# Patient Record
Sex: FEMALE | Race: WHITE | Employment: UNEMPLOYED | URBAN - METROPOLITAN AREA
[De-identification: names, ages, dates, MRNs, and addresses within clinical notes are randomized per-mention and may not be internally consistent; named-entity substitution may affect disease eponyms.]

---

## 2017-04-19 ENCOUNTER — OFFICE VISIT (OUTPATIENT)
Dept: CARDIOLOGY CLINIC | Age: 47
End: 2017-04-19

## 2017-04-19 VITALS
WEIGHT: 293 LBS | OXYGEN SATURATION: 94 % | DIASTOLIC BLOOD PRESSURE: 80 MMHG | HEIGHT: 65 IN | BODY MASS INDEX: 48.82 KG/M2 | HEART RATE: 70 BPM | SYSTOLIC BLOOD PRESSURE: 140 MMHG

## 2017-04-19 DIAGNOSIS — I25.118 CORONARY ARTERY DISEASE OF NATIVE ARTERY OF NATIVE HEART WITH STABLE ANGINA PECTORIS (HCC): Primary | Chronic | ICD-10-CM

## 2017-04-19 DIAGNOSIS — I10 ESSENTIAL HYPERTENSION: Chronic | ICD-10-CM

## 2017-04-19 DIAGNOSIS — E78.00 PURE HYPERCHOLESTEROLEMIA: Chronic | ICD-10-CM

## 2017-04-19 DIAGNOSIS — E66.01 MORBID OBESITY DUE TO EXCESS CALORIES (HCC): Chronic | ICD-10-CM

## 2017-04-19 PROCEDURE — G8598 ASA/ANTIPLAT THER USED: HCPCS | Performed by: INTERNAL MEDICINE

## 2017-04-19 PROCEDURE — G8427 DOCREV CUR MEDS BY ELIG CLIN: HCPCS | Performed by: INTERNAL MEDICINE

## 2017-04-19 PROCEDURE — 1036F TOBACCO NON-USER: CPT | Performed by: INTERNAL MEDICINE

## 2017-04-19 PROCEDURE — 99214 OFFICE O/P EST MOD 30 MIN: CPT | Performed by: INTERNAL MEDICINE

## 2017-04-19 PROCEDURE — G8419 CALC BMI OUT NRM PARAM NOF/U: HCPCS | Performed by: INTERNAL MEDICINE

## 2017-04-19 RX ORDER — HYDROXYZINE HYDROCHLORIDE 25 MG/1
25 TABLET, FILM COATED ORAL 3 TIMES DAILY PRN
Status: ON HOLD | COMMUNITY
End: 2019-07-20

## 2017-04-19 RX ORDER — RANOLAZINE 500 MG/1
500 TABLET, EXTENDED RELEASE ORAL 2 TIMES DAILY
Qty: 60 TABLET | Refills: 3 | Status: ON HOLD
Start: 2017-04-19 | End: 2019-07-20

## 2017-04-19 RX ORDER — LEVOTHYROXINE SODIUM 0.03 MG/1
50 TABLET ORAL DAILY
COMMUNITY

## 2017-04-19 RX ORDER — CETIRIZINE HYDROCHLORIDE 10 MG/1
10 TABLET ORAL DAILY
COMMUNITY

## 2017-04-19 RX ORDER — ZOLPIDEM TARTRATE 10 MG/1
TABLET ORAL NIGHTLY PRN
COMMUNITY

## 2017-04-19 RX ORDER — M-VIT,TX,IRON,MINS/CALC/FOLIC 27MG-0.4MG
1 TABLET ORAL DAILY
COMMUNITY

## 2017-04-19 RX ORDER — PANTOPRAZOLE SODIUM 40 MG/1
40 GRANULE, DELAYED RELEASE ORAL
Status: ON HOLD | COMMUNITY
End: 2019-07-21 | Stop reason: HOSPADM

## 2017-04-19 RX ORDER — DICYCLOMINE HCL 20 MG
20 TABLET ORAL EVERY 6 HOURS
COMMUNITY

## 2017-04-19 RX ORDER — ATORVASTATIN CALCIUM 80 MG/1
80 TABLET, FILM COATED ORAL DAILY
Qty: 30 TABLET | Refills: 5
Start: 2017-04-19

## 2017-04-19 RX ORDER — LANOLIN ALCOHOL/MO/W.PET/CERES
1000 CREAM (GRAM) TOPICAL DAILY
COMMUNITY

## 2017-04-19 RX ORDER — AMLODIPINE BESYLATE 5 MG/1
10 TABLET ORAL DAILY
COMMUNITY

## 2017-04-19 ASSESSMENT — ENCOUNTER SYMPTOMS
CHEST TIGHTNESS: 1
EYE REDNESS: 0
COUGH: 0
COLOR CHANGE: 0
EYE PAIN: 0
WHEEZING: 0
ABDOMINAL PAIN: 0
BLOOD IN STOOL: 0
SHORTNESS OF BREATH: 1

## 2018-01-29 ENCOUNTER — TELEPHONE (OUTPATIENT)
Dept: CARDIOLOGY CLINIC | Age: 48
End: 2018-01-29

## 2018-01-29 NOTE — TELEPHONE ENCOUNTER
Patient of both Dr Love Priest and [facility] calling stating she has gained 42 lbs in the last week. (EPIC last weight is 395.0) She said today's weight is 422.0. She said she has also had on and off chest pain and shortness of breath for 1.5 weeks. Made appt for Friday 1/29/2018. She wants to know if she should be seen sooner? Dr Love Priest has opeinings in [place] on Wednesday.       Will route as URGENT

## 2018-01-30 NOTE — TELEPHONE ENCOUNTER
Patient is already taking torsemide 20 mg 2 tablets BID and has an appt with Dr Douglas Perdomo on 2/2/2018. States she has blurry vision and having memory loss. Started a week and a half ago and chest pain in her shoulder.

## 2019-07-20 ENCOUNTER — APPOINTMENT (OUTPATIENT)
Dept: GENERAL RADIOLOGY | Age: 49
End: 2019-07-20
Payer: MEDICARE

## 2019-07-20 ENCOUNTER — HOSPITAL ENCOUNTER (OUTPATIENT)
Age: 49
Setting detail: OBSERVATION
Discharge: HOME | End: 2019-07-21
Attending: EMERGENCY MEDICINE | Admitting: FAMILY MEDICINE
Payer: MEDICARE

## 2019-07-20 DIAGNOSIS — R11.0 NAUSEA: ICD-10-CM

## 2019-07-20 DIAGNOSIS — R07.9 ACUTE CHEST PAIN: Primary | ICD-10-CM

## 2019-07-20 DIAGNOSIS — R53.1 GENERAL WEAKNESS: ICD-10-CM

## 2019-07-20 LAB
A/G RATIO: 1.2 (ref 1.1–2.2)
ALBUMIN SERPL-MCNC: 3.9 G/DL (ref 3.4–5)
ALP BLD-CCNC: 162 U/L (ref 40–129)
ALT SERPL-CCNC: 28 U/L (ref 10–40)
ANION GAP SERPL CALCULATED.3IONS-SCNC: 11 MMOL/L (ref 3–16)
AST SERPL-CCNC: 22 U/L (ref 15–37)
BASOPHILS ABSOLUTE: 0.1 K/UL (ref 0–0.2)
BASOPHILS RELATIVE PERCENT: 0.7 %
BILIRUB SERPL-MCNC: 0.4 MG/DL (ref 0–1)
BUN BLDV-MCNC: 12 MG/DL (ref 7–20)
CALCIUM SERPL-MCNC: 9.1 MG/DL (ref 8.3–10.6)
CHLORIDE BLD-SCNC: 109 MMOL/L (ref 99–110)
CO2: 21 MMOL/L (ref 21–32)
CREAT SERPL-MCNC: 0.5 MG/DL (ref 0.6–1.1)
EOSINOPHILS ABSOLUTE: 0.2 K/UL (ref 0–0.6)
EOSINOPHILS RELATIVE PERCENT: 1.9 %
GFR AFRICAN AMERICAN: >60
GFR NON-AFRICAN AMERICAN: >60
GLOBULIN: 3.2 G/DL
GLUCOSE BLD-MCNC: 122 MG/DL (ref 70–99)
HCT VFR BLD CALC: 42.5 % (ref 36–48)
HEMOGLOBIN: 14.3 G/DL (ref 12–16)
LIPASE: 10 U/L (ref 13–60)
LYMPHOCYTES ABSOLUTE: 1.6 K/UL (ref 1–5.1)
LYMPHOCYTES RELATIVE PERCENT: 14.9 %
MCH RBC QN AUTO: 31.1 PG (ref 26–34)
MCHC RBC AUTO-ENTMCNC: 33.7 G/DL (ref 31–36)
MCV RBC AUTO: 92.4 FL (ref 80–100)
MONOCYTES ABSOLUTE: 0.5 K/UL (ref 0–1.3)
MONOCYTES RELATIVE PERCENT: 5 %
NEUTROPHILS ABSOLUTE: 8.5 K/UL (ref 1.7–7.7)
NEUTROPHILS RELATIVE PERCENT: 77.5 %
PDW BLD-RTO: 14 % (ref 12.4–15.4)
PLATELET # BLD: 203 K/UL (ref 135–450)
PMV BLD AUTO: 9.7 FL (ref 5–10.5)
POTASSIUM SERPL-SCNC: 3.5 MMOL/L (ref 3.5–5.1)
PRO-BNP: 28 PG/ML (ref 0–124)
RBC # BLD: 4.59 M/UL (ref 4–5.2)
SODIUM BLD-SCNC: 141 MMOL/L (ref 136–145)
TOTAL PROTEIN: 7.1 G/DL (ref 6.4–8.2)
TROPONIN: <0.01 NG/ML
TROPONIN: <0.01 NG/ML
WBC # BLD: 10.9 K/UL (ref 4–11)

## 2019-07-20 PROCEDURE — 83880 ASSAY OF NATRIURETIC PEPTIDE: CPT

## 2019-07-20 PROCEDURE — 85025 COMPLETE CBC W/AUTO DIFF WBC: CPT

## 2019-07-20 PROCEDURE — 96374 THER/PROPH/DIAG INJ IV PUSH: CPT

## 2019-07-20 PROCEDURE — 2580000003 HC RX 258: Performed by: FAMILY MEDICINE

## 2019-07-20 PROCEDURE — 96372 THER/PROPH/DIAG INJ SC/IM: CPT

## 2019-07-20 PROCEDURE — 80053 COMPREHEN METABOLIC PANEL: CPT

## 2019-07-20 PROCEDURE — 6370000000 HC RX 637 (ALT 250 FOR IP): Performed by: FAMILY MEDICINE

## 2019-07-20 PROCEDURE — 96375 TX/PRO/DX INJ NEW DRUG ADDON: CPT

## 2019-07-20 PROCEDURE — G0378 HOSPITAL OBSERVATION PER HR: HCPCS

## 2019-07-20 PROCEDURE — 96376 TX/PRO/DX INJ SAME DRUG ADON: CPT

## 2019-07-20 PROCEDURE — 83690 ASSAY OF LIPASE: CPT

## 2019-07-20 PROCEDURE — 93005 ELECTROCARDIOGRAM TRACING: CPT | Performed by: EMERGENCY MEDICINE

## 2019-07-20 PROCEDURE — 36415 COLL VENOUS BLD VENIPUNCTURE: CPT

## 2019-07-20 PROCEDURE — 6360000002 HC RX W HCPCS: Performed by: PHYSICIAN ASSISTANT

## 2019-07-20 PROCEDURE — 71046 X-RAY EXAM CHEST 2 VIEWS: CPT

## 2019-07-20 PROCEDURE — 84484 ASSAY OF TROPONIN QUANT: CPT

## 2019-07-20 PROCEDURE — 99285 EMERGENCY DEPT VISIT HI MDM: CPT

## 2019-07-20 PROCEDURE — 6370000000 HC RX 637 (ALT 250 FOR IP): Performed by: EMERGENCY MEDICINE

## 2019-07-20 PROCEDURE — 6360000002 HC RX W HCPCS: Performed by: FAMILY MEDICINE

## 2019-07-20 RX ORDER — MORPHINE SULFATE 4 MG/ML
4 INJECTION, SOLUTION INTRAMUSCULAR; INTRAVENOUS EVERY 4 HOURS PRN
Status: DISCONTINUED | OUTPATIENT
Start: 2019-07-20 | End: 2019-07-21 | Stop reason: HOSPADM

## 2019-07-20 RX ORDER — HYDROXYZINE HYDROCHLORIDE 25 MG/1
25 TABLET, FILM COATED ORAL 3 TIMES DAILY PRN
Status: DISCONTINUED | OUTPATIENT
Start: 2019-07-20 | End: 2019-07-20 | Stop reason: ALTCHOICE

## 2019-07-20 RX ORDER — NITROGLYCERIN 0.4 MG/1
0.4 TABLET SUBLINGUAL EVERY 5 MIN PRN
Status: DISCONTINUED | OUTPATIENT
Start: 2019-07-20 | End: 2019-07-20 | Stop reason: SDUPTHER

## 2019-07-20 RX ORDER — CALCIUM CARBONATE 200(500)MG
1 TABLET,CHEWABLE ORAL 2 TIMES DAILY
COMMUNITY

## 2019-07-20 RX ORDER — ONDANSETRON 2 MG/ML
4 INJECTION INTRAMUSCULAR; INTRAVENOUS EVERY 6 HOURS PRN
Status: DISCONTINUED | OUTPATIENT
Start: 2019-07-20 | End: 2019-07-21 | Stop reason: HOSPADM

## 2019-07-20 RX ORDER — METOPROLOL TARTRATE 50 MG/1
100 TABLET, FILM COATED ORAL 2 TIMES DAILY
Status: DISCONTINUED | OUTPATIENT
Start: 2019-07-20 | End: 2019-07-20 | Stop reason: ALTCHOICE

## 2019-07-20 RX ORDER — ISOSORBIDE MONONITRATE 60 MG/1
120 TABLET, EXTENDED RELEASE ORAL DAILY
Status: DISCONTINUED | OUTPATIENT
Start: 2019-07-20 | End: 2019-07-20 | Stop reason: ALTCHOICE

## 2019-07-20 RX ORDER — NITROGLYCERIN 0.4 MG/1
TABLET SUBLINGUAL
Status: DISPENSED
Start: 2019-07-20 | End: 2019-07-21

## 2019-07-20 RX ORDER — ASPIRIN 81 MG/1
81 TABLET ORAL DAILY
Status: DISCONTINUED | OUTPATIENT
Start: 2019-07-21 | End: 2019-07-21 | Stop reason: HOSPADM

## 2019-07-20 RX ORDER — LEVOTHYROXINE SODIUM 0.03 MG/1
50 TABLET ORAL DAILY
Status: DISCONTINUED | OUTPATIENT
Start: 2019-07-21 | End: 2019-07-21 | Stop reason: HOSPADM

## 2019-07-20 RX ORDER — LOSARTAN POTASSIUM 50 MG/1
50 TABLET ORAL DAILY
COMMUNITY

## 2019-07-20 RX ORDER — CETIRIZINE HYDROCHLORIDE 10 MG/1
10 TABLET ORAL NIGHTLY
Status: DISCONTINUED | OUTPATIENT
Start: 2019-07-20 | End: 2019-07-21 | Stop reason: HOSPADM

## 2019-07-20 RX ORDER — NITROGLYCERIN 0.4 MG/1
0.4 TABLET SUBLINGUAL EVERY 5 MIN PRN
Status: DISCONTINUED | OUTPATIENT
Start: 2019-07-20 | End: 2019-07-21 | Stop reason: HOSPADM

## 2019-07-20 RX ORDER — ONDANSETRON 2 MG/ML
4 INJECTION INTRAMUSCULAR; INTRAVENOUS ONCE
Status: COMPLETED | OUTPATIENT
Start: 2019-07-20 | End: 2019-07-20

## 2019-07-20 RX ORDER — RANOLAZINE 500 MG/1
500 TABLET, EXTENDED RELEASE ORAL 2 TIMES DAILY
Status: DISCONTINUED | OUTPATIENT
Start: 2019-07-20 | End: 2019-07-20 | Stop reason: ALTCHOICE

## 2019-07-20 RX ORDER — ACETAZOLAMIDE 250 MG/1
500 TABLET ORAL 3 TIMES DAILY
COMMUNITY

## 2019-07-20 RX ORDER — AMLODIPINE BESYLATE 5 MG/1
10 TABLET ORAL DAILY
Status: DISCONTINUED | OUTPATIENT
Start: 2019-07-20 | End: 2019-07-21 | Stop reason: HOSPADM

## 2019-07-20 RX ORDER — ZOLPIDEM TARTRATE 5 MG/1
5 TABLET ORAL NIGHTLY PRN
Status: DISCONTINUED | OUTPATIENT
Start: 2019-07-20 | End: 2019-07-21 | Stop reason: HOSPADM

## 2019-07-20 RX ORDER — MORPHINE SULFATE 4 MG/ML
4 INJECTION, SOLUTION INTRAMUSCULAR; INTRAVENOUS ONCE
Status: COMPLETED | OUTPATIENT
Start: 2019-07-20 | End: 2019-07-20

## 2019-07-20 RX ORDER — MIRTAZAPINE 15 MG/1
7.5 TABLET, FILM COATED ORAL NIGHTLY
COMMUNITY

## 2019-07-20 RX ORDER — ACETAMINOPHEN 325 MG/1
650 TABLET ORAL EVERY 4 HOURS PRN
Status: DISCONTINUED | OUTPATIENT
Start: 2019-07-20 | End: 2019-07-21 | Stop reason: HOSPADM

## 2019-07-20 RX ORDER — SODIUM CHLORIDE 0.9 % (FLUSH) 0.9 %
10 SYRINGE (ML) INJECTION EVERY 12 HOURS SCHEDULED
Status: DISCONTINUED | OUTPATIENT
Start: 2019-07-20 | End: 2019-07-21 | Stop reason: HOSPADM

## 2019-07-20 RX ORDER — PANTOPRAZOLE SODIUM 40 MG/1
40 TABLET, DELAYED RELEASE ORAL
Status: DISCONTINUED | OUTPATIENT
Start: 2019-07-21 | End: 2019-07-21

## 2019-07-20 RX ORDER — PRAMIPEXOLE DIHYDROCHLORIDE 1.5 MG/1
1.5 TABLET ORAL 2 TIMES DAILY
COMMUNITY

## 2019-07-20 RX ORDER — TRAMADOL HYDROCHLORIDE 50 MG/1
50 TABLET ORAL EVERY 4 HOURS PRN
Status: DISCONTINUED | OUTPATIENT
Start: 2019-07-20 | End: 2019-07-21 | Stop reason: HOSPADM

## 2019-07-20 RX ORDER — POTASSIUM CHLORIDE 20MEQ/15ML
40 LIQUID (ML) ORAL 2 TIMES DAILY
COMMUNITY

## 2019-07-20 RX ORDER — OXYCODONE HYDROCHLORIDE 5 MG/1
10 TABLET ORAL EVERY 6 HOURS PRN
COMMUNITY

## 2019-07-20 RX ORDER — ESCITALOPRAM OXALATE 10 MG/1
20 TABLET ORAL DAILY
Status: DISCONTINUED | OUTPATIENT
Start: 2019-07-21 | End: 2019-07-21 | Stop reason: HOSPADM

## 2019-07-20 RX ORDER — SODIUM CHLORIDE 0.9 % (FLUSH) 0.9 %
10 SYRINGE (ML) INJECTION PRN
Status: DISCONTINUED | OUTPATIENT
Start: 2019-07-20 | End: 2019-07-21 | Stop reason: HOSPADM

## 2019-07-20 RX ORDER — DICYCLOMINE HYDROCHLORIDE 10 MG/1
20 CAPSULE ORAL EVERY 6 HOURS
Status: DISCONTINUED | OUTPATIENT
Start: 2019-07-20 | End: 2019-07-21 | Stop reason: HOSPADM

## 2019-07-20 RX ORDER — GABAPENTIN 300 MG/1
600 CAPSULE ORAL 3 TIMES DAILY
Status: DISCONTINUED | OUTPATIENT
Start: 2019-07-20 | End: 2019-07-21 | Stop reason: HOSPADM

## 2019-07-20 RX ORDER — FAMOTIDINE 20 MG/1
20 TABLET, FILM COATED ORAL NIGHTLY PRN
Status: ON HOLD | COMMUNITY
End: 2019-07-21 | Stop reason: HOSPADM

## 2019-07-20 RX ORDER — FUROSEMIDE 40 MG/1
40 TABLET ORAL DAILY
Status: DISCONTINUED | OUTPATIENT
Start: 2019-07-20 | End: 2019-07-20 | Stop reason: ALTCHOICE

## 2019-07-20 RX ORDER — ATORVASTATIN CALCIUM 40 MG/1
40 TABLET, FILM COATED ORAL DAILY
Status: DISCONTINUED | OUTPATIENT
Start: 2019-07-20 | End: 2019-07-21 | Stop reason: HOSPADM

## 2019-07-20 RX ADMIN — Medication 10 ML: at 22:30

## 2019-07-20 RX ADMIN — ONDANSETRON 4 MG: 2 INJECTION INTRAMUSCULAR; INTRAVENOUS at 16:58

## 2019-07-20 RX ADMIN — DICYCLOMINE HYDROCHLORIDE 20 MG: 10 CAPSULE ORAL at 23:01

## 2019-07-20 RX ADMIN — ZOLPIDEM TARTRATE 5 MG: 5 TABLET ORAL at 23:02

## 2019-07-20 RX ADMIN — ENOXAPARIN SODIUM 40 MG: 40 INJECTION SUBCUTANEOUS at 23:03

## 2019-07-20 RX ADMIN — CETIRIZINE HYDROCHLORIDE 10 MG: 10 TABLET, FILM COATED ORAL at 23:01

## 2019-07-20 RX ADMIN — NITROGLYCERIN 1 INCH: 20 OINTMENT TOPICAL at 17:56

## 2019-07-20 RX ADMIN — NITROGLYCERIN 0.4 MG: 0.4 TABLET, ORALLY DISINTEGRATING SUBLINGUAL at 17:56

## 2019-07-20 RX ADMIN — ONDANSETRON 4 MG: 2 INJECTION INTRAMUSCULAR; INTRAVENOUS at 23:03

## 2019-07-20 RX ADMIN — DESMOPRESSIN ACETATE 40 MG: 0.2 TABLET ORAL at 23:01

## 2019-07-20 RX ADMIN — MORPHINE SULFATE 4 MG: 4 INJECTION INTRAVENOUS at 21:15

## 2019-07-20 RX ADMIN — MORPHINE SULFATE 4 MG: 4 INJECTION INTRAVENOUS at 16:58

## 2019-07-20 RX ADMIN — GABAPENTIN 600 MG: 300 CAPSULE ORAL at 23:02

## 2019-07-20 ASSESSMENT — ENCOUNTER SYMPTOMS
VOMITING: 0
COLOR CHANGE: 0
ANAL BLEEDING: 0
BLOOD IN STOOL: 0
ABDOMINAL PAIN: 0
STRIDOR: 0
CONSTIPATION: 0
SHORTNESS OF BREATH: 1
COUGH: 0
BACK PAIN: 0
NAUSEA: 1
WHEEZING: 0
ABDOMINAL DISTENTION: 0
DIARRHEA: 0

## 2019-07-20 ASSESSMENT — PAIN SCALES - GENERAL
PAINLEVEL_OUTOF10: 5
PAINLEVEL_OUTOF10: 7

## 2019-07-20 ASSESSMENT — PAIN DESCRIPTION - LOCATION
LOCATION: CHEST;ABDOMEN
LOCATION: CHEST
LOCATION: CHEST;ABDOMEN
LOCATION: ABDOMEN;CHEST

## 2019-07-20 ASSESSMENT — PAIN DESCRIPTION - PAIN TYPE: TYPE: ACUTE PAIN

## 2019-07-20 NOTE — ED NOTES
Telemetry confirmed with 3T REFUGIO Neri, 67 NSR. Pt transported inwheelchair, on telemetry, with belongings and family, no distress noted.       Abimbola Jeong RN  07/20/19 1945

## 2019-07-20 NOTE — ED NOTES
Report called to IAIN Reinoso. All questions answered, denies concerns, agreeable to transfer. Awaiting telemetry.       Daxa Wesley RN  07/20/19 1944

## 2019-07-20 NOTE — H&P
daily 60 tablet 3    furosemide (LASIX) 40 MG tablet Take 40 mg by mouth daily      VITAMIN D, CHOLECALCIFEROL, PO Take by mouth daily      isosorbide mononitrate (IMDUR) 30 MG CR tablet Take 120 mg by mouth daily       escitalopram (LEXAPRO) 20 MG tablet Take 20 mg by mouth daily.  gabapentin (NEURONTIN) 600 MG tablet Take 600 mg by mouth 4 times daily.  metoprolol (LOPRESSOR) 100 MG tablet Take 100 mg by mouth 2 times daily.  promethazine (PHENERGAN) 25 MG tablet Take 25 mg by mouth every 4 hours as needed for Nausea.  aspirin 81 MG EC tablet Take 1 tablet by mouth daily.        Current Facility-Administered Medications   Medication Dose Route Frequency Provider Last Rate Last Dose    nitroGLYCERIN (NITROSTAT) SL tablet 0.4 mg  0.4 mg Sublingual Q5 Min PRN Ra Valentino DO   0.4 mg at 07/20/19 1756    nitroglycerin (NITRO-BID) 2 % ointment             nitroGLYCERIN (NITROSTAT) 0.4 MG SL tablet             traMADol (ULTRAM) tablet 50 mg  50 mg Oral Q4H PRN Uri Higginbotham MD        morphine injection 4 mg  4 mg Intravenous Q4H PRN Uri Higginbotham MD         Current Outpatient Medications   Medication Sig Dispense Refill    pantoprazole sodium (PROTONIX) 40 MG PACK packet Take 40 mg by mouth 2 times daily (before meals)      dicyclomine (BENTYL) 20 MG tablet Take 20 mg by mouth every 6 hours      Diphenoxylate-Atropine (LOMOTIL PO) Take by mouth      levothyroxine (SYNTHROID) 25 MCG tablet Take 25 mcg by mouth Daily      zolpidem (AMBIEN) 10 MG tablet Take by mouth nightly as needed for Sleep      cetirizine (ZYRTEC) 10 MG tablet Take 10 mg by mouth daily      vitamin B-12 (CYANOCOBALAMIN) 1000 MCG tablet Take 1,000 mcg by mouth daily      amLODIPine (NORVASC) 5 MG tablet Take 5 mg by mouth daily      hydrOXYzine (ATARAX) 25 MG tablet Take 25 mg by mouth 3 times daily as needed for Itching      Multiple Vitamins-Minerals (THERAPEUTIC MULTIVITAMIN-MINERALS) tablet Take 1 tablet by mouth daily      TRAMADOL HCL PO Take by mouth      CLONAZEPAM PO Take by mouth      atorvastatin (LIPITOR) 80 MG tablet Take 1 tablet by mouth daily 30 tablet 5    ranolazine (RANEXA) 500 MG extended release tablet Take 1 tablet by mouth 2 times daily 60 tablet 3    furosemide (LASIX) 40 MG tablet Take 40 mg by mouth daily      VITAMIN D, CHOLECALCIFEROL, PO Take by mouth daily      isosorbide mononitrate (IMDUR) 30 MG CR tablet Take 120 mg by mouth daily       escitalopram (LEXAPRO) 20 MG tablet Take 20 mg by mouth daily.  gabapentin (NEURONTIN) 600 MG tablet Take 600 mg by mouth 4 times daily.  metoprolol (LOPRESSOR) 100 MG tablet Take 100 mg by mouth 2 times daily.  promethazine (PHENERGAN) 25 MG tablet Take 25 mg by mouth every 4 hours as needed for Nausea.  aspirin 81 MG EC tablet Take 1 tablet by mouth daily. Allergies: Allergies   Allergen Reactions    Lortab [Hydrocodone-Acetaminophen] Hives    Vancomycin Itching    Hydrocodone Hives     Dilaudid okay. From lortab. Has taken vicodin w/o issue per patient      Lisinopril Other (See Comments)     cough    Adhesive Tape Hives    Sulfa Antibiotics Hives        Social History:   reports that she quit smoking about 9 years ago. Her smoking use included cigarettes. She smoked 1.00 pack per day. She has never used smokeless tobacco. She reports that she does not drink alcohol or use drugs. Family History:  family history includes Heart Disease in her father; High Blood Pressure in her mother. ,     Physical Exam:  /75   Pulse 63   Temp 98.8 °F (37.1 °C) (Oral)   Resp 20   Ht 5' 5\" (1.651 m)   Wt (!) 320 lb (145.2 kg)   SpO2 93%   BMI 53.25 kg/m²     General appearance:  Appears comfortable. Well nourished  Eyes: Sclera clear, pupils equal  ENT: Moist mucus membranes, no thrush. Trachea midline. Cardiovascular: Regular rhythm, normal S1, S2. No murmur, gallop, rub.  No edema in lower

## 2019-07-20 NOTE — ED PROVIDER NOTES
[facility]        Pt Name: Max Go  MRN: [id number]  [place] 1970  Date of evaluation: 7/20/2019  Provider: Oliva Harris PA-C  PCP: Erasto Trotter MD    This patient was seen and evaluated by the attending physician Dr Hilda Valenzuela   Patient presents with    Chest Pain     Pt reports she has CAD, arrived via EMS from the car, she states she started having chest pain today, sitting in car, L arm pain, took 1 nitro then called 911       HISTORY OF PRESENT ILLNESS   (Location/Symptom, Timing/Onset, Context/Setting, Quality, Duration, Modifying Factors, Severity)  Note limiting factors. Max Go is a 54 y.o. female who presents to the emergency department complaining of generalized weakness and fatigue starting yesterday evening. Her generalized weakness got worse today. She also reports that since last night her legs appear more swollen than usual.  When she got up to go to the car, she felt very weak. When she sat in the car, she started getting midsternal chest pain rating to the left side of her chest and into the left arm. She took one nitroglycerin tablet before paramedics arrived without any significant improvement. She feels very nauseous with this as well. She does have history of prior gastric surgery and was admitted to [facility] 2 weeks ago for nausea vomiting. They had to stretch out her esophagus and stomach during that visit. Nursing Notes were all reviewed and agreed with or any disagreements were addressed  in the HPI. REVIEW OF SYSTEMS    (2-9 systems for level 4, 10 or more for level 5)     Review of Systems   Constitutional: Positive for fatigue. Negative for chills and fever. HENT: Negative. Eyes: Negative for visual disturbance. Respiratory: Positive for shortness of breath.  Negative for cough, wheezing and Cardiovascular: Normal rate. Pulmonary/Chest: Effort normal and breath sounds normal.   Abdominal: Soft. Bowel sounds are normal. She exhibits no distension. There is no tenderness. Musculoskeletal: Normal range of motion. Trace symmetrical pretibial edema. No posterior calf or thigh tenderness, palpable cord, discoloration. Negative homans. Lymphadenopathy:     She has no cervical adenopathy. Neurological: She is alert and oriented to person, place, and time. No cranial nerve deficit (II-XII intact). No pronator drift, facial droop or slurred speech. Normal finger to nose coordination. Normal rapid alternating hand movement. Normal heel to shin coordination   5 out of 5 strength in all 4 extremities without focal asymmetric weakness, paresthesia or radiculopathy. Skin: Skin is warm and dry. Capillary refill takes less than 2 seconds. No rash noted. She is not diaphoretic. No erythema. No pallor. Psychiatric: She has a normal mood and affect. Her behavior is normal.   Nursing note and vitals reviewed.       DIAGNOSTIC RESULTS   LABS:    Labs Reviewed   CBC WITH AUTO DIFFERENTIAL - Abnormal; Notable for the following components:       Result Value    Neutrophils # 8.5 (*)     All other components within normal limits    Narrative:     Performed at:  [facility] [address], [place], [address]   Phone [phone]   COMPREHENSIVE METABOLIC PANEL - Abnormal; Notable for the following components:    Glucose 122 (*)     CREATININE 0.5 (*)     Alkaline Phosphatase 162 (*)     All other components within normal limits    Narrative:     Performed at:  [facility] [address], [place], [address] [place], [place] [address]   Phone [phone]   LIPASE - Abnormal; Notable for the following components:    Lipase 10.0 (*)     All other components within normal limits    Narrative:     Performed at:  [facility]  3000 concerning pathology. FINAL IMPRESSION      1. Acute chest pain    2. Nausea    3. General weakness          DISPOSITION/PLAN   DISPOSITION  Decision to admit      PATIENT REFERREDTO:  No follow-up provider specified.     DISCHARGE MEDICATIONS:  New Prescriptions    No medications on file       DISCONTINUED MEDICATIONS:  Discontinued Medications    No medications on file              (Please note that portions ofthis note were completed with a voice recognition program.  Efforts were made to edit the dictations but occasionally words are mis-transcribed.)    Sylwia Rose PA-C (electronically signed)           Sylwia Rose PA-C  07/20/19 1189

## 2019-07-20 NOTE — ED PROVIDER NOTES
factors and symptoms concerning for acute coronary syndrome.   She is admitted for further stabilization    [WL]      ED Course User Index  [WL] Clif Caller, [address] [facility] [place],   07/20/19 1949

## 2019-07-21 ENCOUNTER — APPOINTMENT (OUTPATIENT)
Dept: CT IMAGING | Age: 49
End: 2019-07-21
Payer: MEDICARE

## 2019-07-21 VITALS
RESPIRATION RATE: 18 BRPM | OXYGEN SATURATION: 97 % | BODY MASS INDEX: 48.82 KG/M2 | TEMPERATURE: 96 F | HEIGHT: 65 IN | HEART RATE: 54 BPM | WEIGHT: 293 LBS | DIASTOLIC BLOOD PRESSURE: 70 MMHG | SYSTOLIC BLOOD PRESSURE: 135 MMHG

## 2019-07-21 LAB
EKG ATRIAL RATE: 79 BPM
EKG ATRIAL RATE: 81 BPM
EKG DIAGNOSIS: NORMAL
EKG DIAGNOSIS: NORMAL
EKG P AXIS: 45 DEGREES
EKG P AXIS: 53 DEGREES
EKG P-R INTERVAL: 164 MS
EKG P-R INTERVAL: 170 MS
EKG Q-T INTERVAL: 374 MS
EKG Q-T INTERVAL: 386 MS
EKG QRS DURATION: 82 MS
EKG QRS DURATION: 88 MS
EKG QTC CALCULATION (BAZETT): 428 MS
EKG QTC CALCULATION (BAZETT): 448 MS
EKG R AXIS: 73 DEGREES
EKG R AXIS: 85 DEGREES
EKG T AXIS: 50 DEGREES
EKG T AXIS: 54 DEGREES
EKG VENTRICULAR RATE: 79 BPM
EKG VENTRICULAR RATE: 81 BPM
GLUCOSE BLD-MCNC: 129 MG/DL (ref 70–99)
GLUCOSE BLD-MCNC: 90 MG/DL (ref 70–99)
PERFORMED ON: ABNORMAL
PERFORMED ON: NORMAL
TROPONIN: <0.01 NG/ML

## 2019-07-21 PROCEDURE — 84484 ASSAY OF TROPONIN QUANT: CPT

## 2019-07-21 PROCEDURE — C9113 INJ PANTOPRAZOLE SODIUM, VIA: HCPCS | Performed by: INTERNAL MEDICINE

## 2019-07-21 PROCEDURE — 36415 COLL VENOUS BLD VENIPUNCTURE: CPT

## 2019-07-21 PROCEDURE — 94760 N-INVAS EAR/PLS OXIMETRY 1: CPT

## 2019-07-21 PROCEDURE — 94660 CPAP INITIATION&MGMT: CPT

## 2019-07-21 PROCEDURE — 2580000003 HC RX 258: Performed by: FAMILY MEDICINE

## 2019-07-21 PROCEDURE — 96376 TX/PRO/DX INJ SAME DRUG ADON: CPT

## 2019-07-21 PROCEDURE — 93010 ELECTROCARDIOGRAM REPORT: CPT | Performed by: INTERNAL MEDICINE

## 2019-07-21 PROCEDURE — 94761 N-INVAS EAR/PLS OXIMETRY MLT: CPT

## 2019-07-21 PROCEDURE — 6360000002 HC RX W HCPCS: Performed by: INTERNAL MEDICINE

## 2019-07-21 PROCEDURE — G0378 HOSPITAL OBSERVATION PER HR: HCPCS

## 2019-07-21 PROCEDURE — 96375 TX/PRO/DX INJ NEW DRUG ADDON: CPT

## 2019-07-21 PROCEDURE — 93005 ELECTROCARDIOGRAM TRACING: CPT | Performed by: FAMILY MEDICINE

## 2019-07-21 PROCEDURE — 99220 PR INITIAL OBSERVATION CARE/DAY 70 MINUTES: CPT | Performed by: INTERNAL MEDICINE

## 2019-07-21 PROCEDURE — 6370000000 HC RX 637 (ALT 250 FOR IP): Performed by: FAMILY MEDICINE

## 2019-07-21 PROCEDURE — 6360000002 HC RX W HCPCS: Performed by: FAMILY MEDICINE

## 2019-07-21 PROCEDURE — 74176 CT ABD & PELVIS W/O CONTRAST: CPT

## 2019-07-21 RX ORDER — ONDANSETRON 4 MG/1
4 TABLET, ORALLY DISINTEGRATING ORAL 3 TIMES DAILY PRN
Qty: 30 TABLET | Refills: 0 | Status: SHIPPED | OUTPATIENT
Start: 2019-07-21

## 2019-07-21 RX ORDER — ISOSORBIDE MONONITRATE 30 MG/1
30 TABLET, EXTENDED RELEASE ORAL DAILY
Qty: 30 TABLET | Refills: 3 | Status: SHIPPED | OUTPATIENT
Start: 2019-07-21

## 2019-07-21 RX ORDER — PANTOPRAZOLE SODIUM 40 MG/10ML
40 INJECTION, POWDER, LYOPHILIZED, FOR SOLUTION INTRAVENOUS DAILY
Status: DISCONTINUED | OUTPATIENT
Start: 2019-07-21 | End: 2019-07-21 | Stop reason: HOSPADM

## 2019-07-21 RX ORDER — METOCLOPRAMIDE HYDROCHLORIDE 5 MG/ML
10 INJECTION INTRAMUSCULAR; INTRAVENOUS ONCE
Status: COMPLETED | OUTPATIENT
Start: 2019-07-21 | End: 2019-07-21

## 2019-07-21 RX ORDER — ISOSORBIDE MONONITRATE 30 MG/1
30 TABLET, EXTENDED RELEASE ORAL DAILY
Status: DISCONTINUED | OUTPATIENT
Start: 2019-07-21 | End: 2019-07-21 | Stop reason: HOSPADM

## 2019-07-21 RX ORDER — PANTOPRAZOLE SODIUM 40 MG/10ML
40 INJECTION, POWDER, LYOPHILIZED, FOR SOLUTION INTRAVENOUS DAILY
Status: DISCONTINUED | OUTPATIENT
Start: 2019-07-22 | End: 2019-07-21

## 2019-07-21 RX ADMIN — MORPHINE SULFATE 4 MG: 4 INJECTION INTRAVENOUS at 14:36

## 2019-07-21 RX ADMIN — ASPIRIN 81 MG: 81 TABLET, COATED ORAL at 08:20

## 2019-07-21 RX ADMIN — DESMOPRESSIN ACETATE 40 MG: 0.2 TABLET ORAL at 08:20

## 2019-07-21 RX ADMIN — Medication 10 ML: at 14:37

## 2019-07-21 RX ADMIN — PANTOPRAZOLE SODIUM 40 MG: 40 INJECTION, POWDER, FOR SOLUTION INTRAVENOUS at 15:50

## 2019-07-21 RX ADMIN — MORPHINE SULFATE 4 MG: 4 INJECTION INTRAVENOUS at 05:25

## 2019-07-21 RX ADMIN — ESCITALOPRAM OXALATE 20 MG: 10 TABLET ORAL at 08:20

## 2019-07-21 RX ADMIN — DICYCLOMINE HYDROCHLORIDE 20 MG: 10 CAPSULE ORAL at 05:24

## 2019-07-21 RX ADMIN — MORPHINE SULFATE 4 MG: 4 INJECTION INTRAVENOUS at 10:35

## 2019-07-21 RX ADMIN — MORPHINE SULFATE 4 MG: 4 INJECTION INTRAVENOUS at 01:04

## 2019-07-21 RX ADMIN — PANTOPRAZOLE SODIUM 40 MG: 40 TABLET, DELAYED RELEASE ORAL at 05:25

## 2019-07-21 RX ADMIN — TRAMADOL HYDROCHLORIDE 50 MG: 50 TABLET, FILM COATED ORAL at 04:15

## 2019-07-21 RX ADMIN — NITROGLYCERIN 0.4 MG: 0.4 TABLET, ORALLY DISINTEGRATING SUBLINGUAL at 00:50

## 2019-07-21 RX ADMIN — ONDANSETRON 4 MG: 2 INJECTION INTRAMUSCULAR; INTRAVENOUS at 10:35

## 2019-07-21 RX ADMIN — LEVOTHYROXINE SODIUM 50 MCG: 25 TABLET ORAL at 05:25

## 2019-07-21 RX ADMIN — GABAPENTIN 600 MG: 300 CAPSULE ORAL at 10:47

## 2019-07-21 RX ADMIN — NITROGLYCERIN 0.4 MG: 0.4 TABLET, ORALLY DISINTEGRATING SUBLINGUAL at 00:38

## 2019-07-21 RX ADMIN — Medication 10 ML: at 08:31

## 2019-07-21 RX ADMIN — METOCLOPRAMIDE 10 MG: 5 INJECTION, SOLUTION INTRAMUSCULAR; INTRAVENOUS at 15:50

## 2019-07-21 RX ADMIN — AMLODIPINE BESYLATE 10 MG: 5 TABLET ORAL at 08:20

## 2019-07-21 ASSESSMENT — PAIN DESCRIPTION - LOCATION
LOCATION: CHEST;ABDOMEN
LOCATION: CHEST;ABDOMEN

## 2019-07-21 ASSESSMENT — PAIN SCALES - GENERAL
PAINLEVEL_OUTOF10: 4
PAINLEVEL_OUTOF10: 7
PAINLEVEL_OUTOF10: 7
PAINLEVEL_OUTOF10: 5
PAINLEVEL_OUTOF10: 7
PAINLEVEL_OUTOF10: 6
PAINLEVEL_OUTOF10: 7

## 2019-07-21 ASSESSMENT — PAIN DESCRIPTION - ORIENTATION
ORIENTATION: LEFT;POSTERIOR
ORIENTATION: LEFT;POSTERIOR

## 2019-07-21 ASSESSMENT — PAIN DESCRIPTION - PAIN TYPE: TYPE: ACUTE PAIN

## 2019-07-21 ASSESSMENT — PAIN DESCRIPTION - DIRECTION: RADIATING_TOWARDS: BACK

## 2019-07-22 NOTE — CONSULTS
on 275. While she  was helping her friend, she began with pressure in her upper back that  has really persisted for the last 24 hours. She has not found relief. She has been monitored in the hospital.  EKG showed an old inferior wall  myocardial infarction, no acute ST segment changes. Troponins have been  normal.  The patient denies shortness of breath with this. She just  feels the discomfort after she had the treatment by gastroenterology. They recommended liquid Prevacid to treat gastroesophageal reflux, but  she has not yet received the liquid Prevacid. MEDICATIONS AT HOME:  Tums p.r.n., Remeron 15 mg nightly, Prevacid 30 mg  t.i.d., losartan 50 mg daily, Diamox 500 mg t.i.d., Pepcid 20 mg as  needed, Mirapex 1.5 mg b.i.d., potassium 40 mEq b.i.d., Roxicodone 10 mg  every 6 hours as needed, Bentyl 20 mg every 6 hours as needed, Lomotil  as needed, Synthroid 50 mcg daily, Ambien 10 mg nightly as needed,  Zyrtec 10 mg daily, vitamin B12 daily, Norvasc 10 mg daily,  multivitamin, Lipitor 40 mg daily, Lexapro 20 mg daily, Neurontin 600 mg  t.i.d., Phenergan as needed, aspirin 81 mg daily, tramadol as needed. She states that at one time she was taking up to 42 different  medications since her Malika-en-Y bypass and successful weight loss. She  has been able to cut back on her medications. She previously was on  Ranexa that seemed to help angina; however, she stopped Ranexa with her  weight loss. She was on Imdur as well that seemed to help her angina. She is not sure why that medication was stopped. PAST MEDICAL HISTORY:  Notable for the coronary artery disease with  chronic occlusion of the right coronary artery that is well  collateralized, history of moderate carotid stenosis, history of  diastolic congestive heart failure, history of diabetes. She has  hyperlipidemia, hypertension, morbid obesity with successful 120 pound  weight loss. She has obstructive sleep apnea.     PAST SURGICAL HISTORY:  Includes lap-band surgery, Malika-en-Y gastric  bypass for weight loss since the lap-band did not work, cholecystectomy  in the past, hernia repair, tonsillectomy. SOCIAL HISTORY:  She lives with her . She is on medical  disability. She is hoping to return to her work as her weight continues  to improve. She quit smoking approximately 10 years ago. FAMILY HISTORY:  As above. ROS: 14 system ROS done. Pertinent positives in HPI  PHYSICAL EXAMINATION:  GENERAL:  Currently on physical examination, she is a lady who is obese. She is 5 feet 5 inches, weighs 320 pounds, but that is 120 pounds less  than her peak weight of 444 pounds. She appears comfortable, in no  acute distress. VITAL SIGNS:  Blood pressure 120/70, pulse is 64 and regular. HEENT:  Sclerae are clear. Posterior pharynx clear. NECK:  Neck is supple. There are no carotid bruits. LUNGS:  Clear. CARDIAC:  Sounds are normal.  Chest wall is nontender to palpation. ABDOMEN:  Without masses. MUSCULOSKELETAL:  There is no tenderness elicited. She moves all  extremities well. NEUROLOGIC:  Cranial nerves II through XII intact. PSYCHIATRIC:  Appropriate mood and affect. SKIN:  Warm and dry. LABORATORY DATA:  Labs are reviewed. Troponins have been normal.    DIAGNOSTIC DATA:  EKG is unchanged. IMPRESSION:  The patient with recent stress testing done outpatient,  which shows findings consistent with a chronic occlusion of her right  coronary artery. I feel the current chest pain is noncardiac in  etiology. Chest and abdominal pain most likely due to profound  gastroesophageal reflux disease post Malika-en-Y. We will try IV Protonix  to see if that alleviates her symptoms. The hyperlipidemia with  apparent good control, I do not have active labs from that. Chronic  back pain. RECOMMENDATIONS:  Give her a trial of IV Protonix.   Since troponins have  been normal, I do not feel anything has changed with her

## 2019-08-29 NOTE — DISCHARGE SUMMARY
cyanosis, no edema  Skin: No lesion or masses  Neurologic:  Neurovascularly intact without any focal sensory/motor deficits. Cranial nerves: II-XII intact, grossly non-focal.  Psychiatric:  A & O x3  Neuro: Grossly intact, moves all four extremities     Labs: For convenience and continuity at follow-up the following most recent labs are provided:    Lab Results   Component Value Date    WBC 10.9 07/20/2019    HGB 14.3 07/20/2019    HCT 42.5 07/20/2019    MCV 92.4 07/20/2019     07/20/2019     07/20/2019    K 3.5 07/20/2019     07/20/2019    CO2 21 07/20/2019    BUN 12 07/20/2019    CREATININE 0.5 07/20/2019    CALCIUM 9.1 07/20/2019    PHOS 3.7 04/07/2015    ALKPHOS 162 07/20/2019    ALT 28 07/20/2019    AST 22 07/20/2019    BILITOT 0.4 07/20/2019    BILIDIR <0.2 04/06/2015    LABALBU 3.9 07/20/2019     No results found for: INR    Radiology:  No results found. EKG     Sinus rhythm with Fusion complexesLow voltage QRSBorderline ECGWhen compared with ECG of 20-JUL-2019 16:30,Fusion complexes are now Present      The patient was seen and examined on day of discharge and this discharge summary is in conjunction with any daily progress note from day of discharge. Time Spent on discharge is 30 minutes  in the examination, evaluation, counseling and review of medications and discharge plan. Note that  30 minutes was spent in preparing discharge papers, discussing discharge with patient, medication review, etc.       Signed:    Ant Lopez MD   8/28/2019      Thank you Susan Corrigan MD for the opportunity to be involved in this patient's care.  If you have any questions or concerns please feel free to contact me at [phone]

## 2023-01-12 ENCOUNTER — HOSPITAL ENCOUNTER (INPATIENT)
Age: 53
LOS: 2 days | Discharge: HOME | DRG: 638 | End: 2023-01-14
Attending: EMERGENCY MEDICINE | Admitting: INTERNAL MEDICINE
Payer: MEDICARE

## 2023-01-12 DIAGNOSIS — Z78.9 FAILURE OF OUTPATIENT TREATMENT: ICD-10-CM

## 2023-01-12 DIAGNOSIS — L03.116 BILATERAL LOWER LEG CELLULITIS: Primary | ICD-10-CM

## 2023-01-12 DIAGNOSIS — L03.115 BILATERAL LOWER LEG CELLULITIS: Primary | ICD-10-CM

## 2023-01-12 DIAGNOSIS — R60.0 BILATERAL LOWER EXTREMITY EDEMA: ICD-10-CM

## 2023-01-12 DIAGNOSIS — R03.0 ELEVATED BLOOD PRESSURE READING: ICD-10-CM

## 2023-01-12 LAB
A/G RATIO: 1.3 (ref 1.1–2.2)
ALBUMIN SERPL-MCNC: 3.9 G/DL (ref 3.4–5)
ALP BLD-CCNC: 167 U/L (ref 40–129)
ALT SERPL-CCNC: 18 U/L (ref 10–40)
ANION GAP SERPL CALCULATED.3IONS-SCNC: 13 MMOL/L (ref 3–16)
ANION GAP SERPL CALCULATED.3IONS-SCNC: 14 MMOL/L (ref 3–16)
AST SERPL-CCNC: 21 U/L (ref 15–37)
BASOPHILS ABSOLUTE: 0.1 K/UL (ref 0–0.2)
BASOPHILS RELATIVE PERCENT: 0.9 %
BILIRUB SERPL-MCNC: 0.3 MG/DL (ref 0–1)
BILIRUBIN URINE: NEGATIVE
BLOOD, URINE: NEGATIVE
BUN BLDV-MCNC: 14 MG/DL (ref 7–20)
BUN BLDV-MCNC: 14 MG/DL (ref 7–20)
CALCIUM SERPL-MCNC: 9.1 MG/DL (ref 8.3–10.6)
CALCIUM SERPL-MCNC: 9.1 MG/DL (ref 8.3–10.6)
CHLORIDE BLD-SCNC: 102 MMOL/L (ref 99–110)
CHLORIDE BLD-SCNC: 104 MMOL/L (ref 99–110)
CLARITY: CLEAR
CO2: 24 MMOL/L (ref 21–32)
CO2: 24 MMOL/L (ref 21–32)
COLOR: YELLOW
CREAT SERPL-MCNC: 0.7 MG/DL (ref 0.6–1.1)
CREAT SERPL-MCNC: 0.7 MG/DL (ref 0.6–1.1)
EOSINOPHILS ABSOLUTE: 0.3 K/UL (ref 0–0.6)
EOSINOPHILS RELATIVE PERCENT: 2.6 %
GFR SERPL CREATININE-BSD FRML MDRD: >60 ML/MIN/{1.73_M2}
GFR SERPL CREATININE-BSD FRML MDRD: >60 ML/MIN/{1.73_M2}
GLUCOSE BLD-MCNC: 101 MG/DL (ref 70–99)
GLUCOSE BLD-MCNC: 103 MG/DL (ref 70–99)
GLUCOSE URINE: NEGATIVE MG/DL
HCG QUALITATIVE: NEGATIVE
HCT VFR BLD CALC: 44 % (ref 36–48)
HEMOGLOBIN: 14.7 G/DL (ref 12–16)
KETONES, URINE: NEGATIVE MG/DL
LACTIC ACID: 0.8 MMOL/L (ref 0.4–2)
LEUKOCYTE ESTERASE, URINE: NEGATIVE
LYMPHOCYTES ABSOLUTE: 1.6 K/UL (ref 1–5.1)
LYMPHOCYTES RELATIVE PERCENT: 14.8 %
MCH RBC QN AUTO: 30.9 PG (ref 26–34)
MCHC RBC AUTO-ENTMCNC: 33.3 G/DL (ref 31–36)
MCV RBC AUTO: 92.8 FL (ref 80–100)
MICROSCOPIC EXAMINATION: NORMAL
MONOCYTES ABSOLUTE: 0.6 K/UL (ref 0–1.3)
MONOCYTES RELATIVE PERCENT: 6 %
NEUTROPHILS ABSOLUTE: 8.1 K/UL (ref 1.7–7.7)
NEUTROPHILS RELATIVE PERCENT: 75.7 %
NITRITE, URINE: NEGATIVE
PDW BLD-RTO: 14.3 % (ref 12.4–15.4)
PH UA: 6.5 (ref 5–8)
PLATELET # BLD: 284 K/UL (ref 135–450)
PMV BLD AUTO: 8.3 FL (ref 5–10.5)
POTASSIUM REFLEX MAGNESIUM: 4.3 MMOL/L (ref 3.5–5.1)
POTASSIUM REFLEX MAGNESIUM: 4.4 MMOL/L (ref 3.5–5.1)
PRO-BNP: 34 PG/ML (ref 0–124)
PROTEIN UA: NEGATIVE MG/DL
RBC # BLD: 4.75 M/UL (ref 4–5.2)
SODIUM BLD-SCNC: 140 MMOL/L (ref 136–145)
SODIUM BLD-SCNC: 141 MMOL/L (ref 136–145)
SPECIFIC GRAVITY UA: 1.01 (ref 1–1.03)
T4 FREE: 1.2 NG/DL (ref 0.9–1.8)
TOTAL PROTEIN: 7 G/DL (ref 6.4–8.2)
TROPONIN: <0.01 NG/ML
TSH SERPL DL<=0.05 MIU/L-ACNC: 1.93 UIU/ML (ref 0.27–4.2)
URINE REFLEX TO CULTURE: NORMAL
URINE TYPE: NORMAL
UROBILINOGEN, URINE: 0.2 E.U./DL
WBC # BLD: 10.6 K/UL (ref 4–11)

## 2023-01-12 PROCEDURE — 81003 URINALYSIS AUTO W/O SCOPE: CPT

## 2023-01-12 PROCEDURE — 83605 ASSAY OF LACTIC ACID: CPT

## 2023-01-12 PROCEDURE — 6360000002 HC RX W HCPCS: Performed by: INTERNAL MEDICINE

## 2023-01-12 PROCEDURE — 84703 CHORIONIC GONADOTROPIN ASSAY: CPT

## 2023-01-12 PROCEDURE — 93970 EXTREMITY STUDY: CPT

## 2023-01-12 PROCEDURE — 83880 ASSAY OF NATRIURETIC PEPTIDE: CPT

## 2023-01-12 PROCEDURE — 36415 COLL VENOUS BLD VENIPUNCTURE: CPT

## 2023-01-12 PROCEDURE — 87040 BLOOD CULTURE FOR BACTERIA: CPT

## 2023-01-12 PROCEDURE — 84443 ASSAY THYROID STIM HORMONE: CPT

## 2023-01-12 PROCEDURE — 96365 THER/PROPH/DIAG IV INF INIT: CPT

## 2023-01-12 PROCEDURE — 99285 EMERGENCY DEPT VISIT HI MDM: CPT

## 2023-01-12 PROCEDURE — 84484 ASSAY OF TROPONIN QUANT: CPT

## 2023-01-12 PROCEDURE — 1200000000 HC SEMI PRIVATE

## 2023-01-12 PROCEDURE — 6360000002 HC RX W HCPCS: Performed by: EMERGENCY MEDICINE

## 2023-01-12 PROCEDURE — 2580000003 HC RX 258: Performed by: EMERGENCY MEDICINE

## 2023-01-12 PROCEDURE — 80053 COMPREHEN METABOLIC PANEL: CPT

## 2023-01-12 PROCEDURE — 85025 COMPLETE CBC W/AUTO DIFF WBC: CPT

## 2023-01-12 PROCEDURE — 2580000003 HC RX 258: Performed by: INTERNAL MEDICINE

## 2023-01-12 PROCEDURE — 6370000000 HC RX 637 (ALT 250 FOR IP): Performed by: INTERNAL MEDICINE

## 2023-01-12 PROCEDURE — 96375 TX/PRO/DX INJ NEW DRUG ADDON: CPT

## 2023-01-12 PROCEDURE — 84439 ASSAY OF FREE THYROXINE: CPT

## 2023-01-12 PROCEDURE — 2500000003 HC RX 250 WO HCPCS: Performed by: EMERGENCY MEDICINE

## 2023-01-12 RX ORDER — OXYCODONE HYDROCHLORIDE 10 MG/1
10 TABLET ORAL EVERY 6 HOURS PRN
Status: DISCONTINUED | OUTPATIENT
Start: 2023-01-12 | End: 2023-01-14 | Stop reason: HOSPADM

## 2023-01-12 RX ORDER — POTASSIUM CHLORIDE 20 MEQ/1
40 TABLET, EXTENDED RELEASE ORAL PRN
Status: DISCONTINUED | OUTPATIENT
Start: 2023-01-12 | End: 2023-01-14 | Stop reason: HOSPADM

## 2023-01-12 RX ORDER — FENTANYL CITRATE 50 UG/ML
100 INJECTION, SOLUTION INTRAMUSCULAR; INTRAVENOUS ONCE
Status: COMPLETED | OUTPATIENT
Start: 2023-01-12 | End: 2023-01-12

## 2023-01-12 RX ORDER — ASPIRIN 81 MG/1
81 TABLET ORAL DAILY
Status: DISCONTINUED | OUTPATIENT
Start: 2023-01-13 | End: 2023-01-14 | Stop reason: HOSPADM

## 2023-01-12 RX ORDER — PROMETHAZINE HYDROCHLORIDE 25 MG/1
25 TABLET ORAL EVERY 4 HOURS PRN
Status: DISCONTINUED | OUTPATIENT
Start: 2023-01-12 | End: 2023-01-14 | Stop reason: HOSPADM

## 2023-01-12 RX ORDER — CYCLOBENZAPRINE HCL 10 MG
10 TABLET ORAL 2 TIMES DAILY
COMMUNITY

## 2023-01-12 RX ORDER — TORSEMIDE 20 MG/1
20 TABLET ORAL DAILY
COMMUNITY

## 2023-01-12 RX ORDER — LORAZEPAM 1 MG/1
1 TABLET ORAL DAILY PRN
COMMUNITY

## 2023-01-12 RX ORDER — MAGNESIUM SULFATE IN WATER 40 MG/ML
2000 INJECTION, SOLUTION INTRAVENOUS PRN
Status: DISCONTINUED | OUTPATIENT
Start: 2023-01-12 | End: 2023-01-14 | Stop reason: HOSPADM

## 2023-01-12 RX ORDER — TRAZODONE HYDROCHLORIDE 100 MG/1
200 TABLET ORAL NIGHTLY
COMMUNITY

## 2023-01-12 RX ORDER — SODIUM CHLORIDE 9 MG/ML
INJECTION, SOLUTION INTRAVENOUS PRN
Status: DISCONTINUED | OUTPATIENT
Start: 2023-01-12 | End: 2023-01-14 | Stop reason: HOSPADM

## 2023-01-12 RX ORDER — FAMOTIDINE 20 MG/1
20 TABLET, FILM COATED ORAL 2 TIMES DAILY
Status: DISCONTINUED | OUTPATIENT
Start: 2023-01-12 | End: 2023-01-14 | Stop reason: HOSPADM

## 2023-01-12 RX ORDER — DIPHENHYDRAMINE HYDROCHLORIDE 50 MG/ML
50 INJECTION INTRAMUSCULAR; INTRAVENOUS ONCE
Status: COMPLETED | OUTPATIENT
Start: 2023-01-12 | End: 2023-01-12

## 2023-01-12 RX ORDER — DIPHENHYDRAMINE HCL 25 MG
25 TABLET ORAL DAILY PRN
Status: DISCONTINUED | OUTPATIENT
Start: 2023-01-12 | End: 2023-01-14 | Stop reason: HOSPADM

## 2023-01-12 RX ORDER — POTASSIUM CHLORIDE 7.45 MG/ML
10 INJECTION INTRAVENOUS PRN
Status: DISCONTINUED | OUTPATIENT
Start: 2023-01-12 | End: 2023-01-14 | Stop reason: HOSPADM

## 2023-01-12 RX ORDER — AMLODIPINE BESYLATE 5 MG/1
5 TABLET ORAL DAILY
Status: DISCONTINUED | OUTPATIENT
Start: 2023-01-13 | End: 2023-01-14 | Stop reason: HOSPADM

## 2023-01-12 RX ORDER — LORAZEPAM 1 MG/1
1 TABLET ORAL DAILY PRN
Status: DISCONTINUED | OUTPATIENT
Start: 2023-01-12 | End: 2023-01-14 | Stop reason: HOSPADM

## 2023-01-12 RX ORDER — LEVOTHYROXINE SODIUM 0.07 MG/1
75 TABLET ORAL DAILY
Status: DISCONTINUED | OUTPATIENT
Start: 2023-01-13 | End: 2023-01-14 | Stop reason: HOSPADM

## 2023-01-12 RX ORDER — TRAZODONE HYDROCHLORIDE 100 MG/1
200 TABLET ORAL NIGHTLY
Status: DISCONTINUED | OUTPATIENT
Start: 2023-01-12 | End: 2023-01-14 | Stop reason: HOSPADM

## 2023-01-12 RX ORDER — LIDOCAINE 50 MG/G
1 PATCH TOPICAL DAILY
COMMUNITY

## 2023-01-12 RX ORDER — ENOXAPARIN SODIUM 100 MG/ML
40 INJECTION SUBCUTANEOUS 2 TIMES DAILY
Status: DISCONTINUED | OUTPATIENT
Start: 2023-01-12 | End: 2023-01-14 | Stop reason: HOSPADM

## 2023-01-12 RX ORDER — GABAPENTIN 400 MG/1
800 CAPSULE ORAL 3 TIMES DAILY
Status: DISCONTINUED | OUTPATIENT
Start: 2023-01-12 | End: 2023-01-14 | Stop reason: HOSPADM

## 2023-01-12 RX ORDER — VANCOMYCIN 1.75 G/350ML
1250 INJECTION, SOLUTION INTRAVENOUS EVERY 12 HOURS
Status: DISCONTINUED | OUTPATIENT
Start: 2023-01-13 | End: 2023-01-13

## 2023-01-12 RX ORDER — ALBUTEROL SULFATE 90 UG/1
2 AEROSOL, METERED RESPIRATORY (INHALATION) EVERY 6 HOURS PRN
COMMUNITY

## 2023-01-12 RX ORDER — VENLAFAXINE 75 MG/1
75 TABLET ORAL 2 TIMES DAILY
COMMUNITY

## 2023-01-12 RX ORDER — MORPHINE SULFATE 2 MG/ML
1 INJECTION, SOLUTION INTRAMUSCULAR; INTRAVENOUS
Status: DISCONTINUED | OUTPATIENT
Start: 2023-01-12 | End: 2023-01-12

## 2023-01-12 RX ORDER — DIPHENHYDRAMINE HCL 25 MG
25 TABLET ORAL DAILY PRN
COMMUNITY

## 2023-01-12 RX ORDER — TORSEMIDE 20 MG/1
20 TABLET ORAL DAILY
Status: DISCONTINUED | OUTPATIENT
Start: 2023-01-13 | End: 2023-01-14 | Stop reason: HOSPADM

## 2023-01-12 RX ORDER — ACETAMINOPHEN 325 MG/1
650 TABLET ORAL EVERY 6 HOURS PRN
Status: DISCONTINUED | OUTPATIENT
Start: 2023-01-12 | End: 2023-01-14 | Stop reason: HOSPADM

## 2023-01-12 RX ORDER — ENOXAPARIN SODIUM 100 MG/ML
40 INJECTION SUBCUTANEOUS 2 TIMES DAILY
Status: CANCELLED | OUTPATIENT
Start: 2023-01-12

## 2023-01-12 RX ORDER — AMITRIPTYLINE HYDROCHLORIDE 25 MG/1
25 TABLET, FILM COATED ORAL NIGHTLY
Status: DISCONTINUED | OUTPATIENT
Start: 2023-01-12 | End: 2023-01-14 | Stop reason: HOSPADM

## 2023-01-12 RX ORDER — AMITRIPTYLINE HYDROCHLORIDE 25 MG/1
25 TABLET, FILM COATED ORAL NIGHTLY
COMMUNITY

## 2023-01-12 RX ORDER — METOPROLOL TARTRATE 50 MG/1
50 TABLET, FILM COATED ORAL 2 TIMES DAILY
Status: DISCONTINUED | OUTPATIENT
Start: 2023-01-12 | End: 2023-01-14 | Stop reason: HOSPADM

## 2023-01-12 RX ORDER — NYSTATIN 100000 [USP'U]/G
POWDER TOPICAL PRN
COMMUNITY

## 2023-01-12 RX ORDER — ONDANSETRON 2 MG/ML
8 INJECTION INTRAMUSCULAR; INTRAVENOUS ONCE
Status: COMPLETED | OUTPATIENT
Start: 2023-01-12 | End: 2023-01-12

## 2023-01-12 RX ORDER — ALBUTEROL SULFATE 90 UG/1
2 AEROSOL, METERED RESPIRATORY (INHALATION) EVERY 6 HOURS PRN
Status: DISCONTINUED | OUTPATIENT
Start: 2023-01-12 | End: 2023-01-14 | Stop reason: HOSPADM

## 2023-01-12 RX ORDER — LABETALOL HYDROCHLORIDE 5 MG/ML
10 INJECTION, SOLUTION INTRAVENOUS ONCE
Status: COMPLETED | OUTPATIENT
Start: 2023-01-12 | End: 2023-01-12

## 2023-01-12 RX ORDER — SODIUM CHLORIDE 0.9 % (FLUSH) 0.9 %
10 SYRINGE (ML) INJECTION EVERY 12 HOURS SCHEDULED
Status: DISCONTINUED | OUTPATIENT
Start: 2023-01-12 | End: 2023-01-14 | Stop reason: HOSPADM

## 2023-01-12 RX ORDER — CYCLOBENZAPRINE HCL 10 MG
10 TABLET ORAL 2 TIMES DAILY
Status: DISCONTINUED | OUTPATIENT
Start: 2023-01-12 | End: 2023-01-14 | Stop reason: HOSPADM

## 2023-01-12 RX ORDER — PRAMIPEXOLE DIHYDROCHLORIDE 0.5 MG/1
1.5 TABLET ORAL 2 TIMES DAILY
Status: DISCONTINUED | OUTPATIENT
Start: 2023-01-12 | End: 2023-01-14 | Stop reason: HOSPADM

## 2023-01-12 RX ORDER — CETIRIZINE HYDROCHLORIDE 10 MG/1
10 TABLET ORAL DAILY
Status: DISCONTINUED | OUTPATIENT
Start: 2023-01-13 | End: 2023-01-14 | Stop reason: HOSPADM

## 2023-01-12 RX ORDER — HYDROXYZINE HYDROCHLORIDE 25 MG/1
25 TABLET, FILM COATED ORAL 2 TIMES DAILY
COMMUNITY

## 2023-01-12 RX ORDER — CLINDAMYCIN PHOSPHATE 600 MG/50ML
600 INJECTION INTRAVENOUS ONCE
Status: COMPLETED | OUTPATIENT
Start: 2023-01-12 | End: 2023-01-12

## 2023-01-12 RX ORDER — HYDROXYZINE HYDROCHLORIDE 25 MG/1
25 TABLET, FILM COATED ORAL 2 TIMES DAILY
Status: DISCONTINUED | OUTPATIENT
Start: 2023-01-12 | End: 2023-01-14 | Stop reason: HOSPADM

## 2023-01-12 RX ORDER — DIPHENHYDRAMINE HYDROCHLORIDE 50 MG/ML
25 INJECTION INTRAMUSCULAR; INTRAVENOUS EVERY 12 HOURS
Status: DISCONTINUED | OUTPATIENT
Start: 2023-01-13 | End: 2023-01-14 | Stop reason: HOSPADM

## 2023-01-12 RX ORDER — ONDANSETRON 2 MG/ML
4 INJECTION INTRAMUSCULAR; INTRAVENOUS EVERY 6 HOURS PRN
Status: DISCONTINUED | OUTPATIENT
Start: 2023-01-12 | End: 2023-01-14 | Stop reason: HOSPADM

## 2023-01-12 RX ORDER — SODIUM CHLORIDE 0.9 % (FLUSH) 0.9 %
10 SYRINGE (ML) INJECTION PRN
Status: DISCONTINUED | OUTPATIENT
Start: 2023-01-12 | End: 2023-01-14 | Stop reason: HOSPADM

## 2023-01-12 RX ORDER — ATORVASTATIN CALCIUM 80 MG/1
80 TABLET, FILM COATED ORAL NIGHTLY
Status: DISCONTINUED | OUTPATIENT
Start: 2023-01-12 | End: 2023-01-14 | Stop reason: HOSPADM

## 2023-01-12 RX ORDER — PROMETHAZINE HYDROCHLORIDE 25 MG/1
12.5 TABLET ORAL EVERY 6 HOURS PRN
Status: DISCONTINUED | OUTPATIENT
Start: 2023-01-12 | End: 2023-01-12 | Stop reason: SDUPTHER

## 2023-01-12 RX ORDER — POTASSIUM CHLORIDE 750 MG/1
10 TABLET, EXTENDED RELEASE ORAL 2 TIMES DAILY
COMMUNITY

## 2023-01-12 RX ORDER — KETOROLAC TROMETHAMINE 30 MG/ML
30 INJECTION, SOLUTION INTRAMUSCULAR; INTRAVENOUS ONCE
Status: COMPLETED | OUTPATIENT
Start: 2023-01-12 | End: 2023-01-12

## 2023-01-12 RX ORDER — ACETAMINOPHEN 650 MG/1
650 SUPPOSITORY RECTAL EVERY 6 HOURS PRN
Status: DISCONTINUED | OUTPATIENT
Start: 2023-01-12 | End: 2023-01-14 | Stop reason: HOSPADM

## 2023-01-12 RX ORDER — FAMOTIDINE 20 MG/1
20 TABLET, FILM COATED ORAL 2 TIMES DAILY
COMMUNITY

## 2023-01-12 RX ADMIN — AMITRIPTYLINE HYDROCHLORIDE 25 MG: 25 TABLET, FILM COATED ORAL at 22:54

## 2023-01-12 RX ADMIN — HYDROMORPHONE HYDROCHLORIDE 1 MG: 1 INJECTION, SOLUTION INTRAMUSCULAR; INTRAVENOUS; SUBCUTANEOUS at 17:23

## 2023-01-12 RX ADMIN — ATORVASTATIN CALCIUM 80 MG: 80 TABLET, FILM COATED ORAL at 22:53

## 2023-01-12 RX ADMIN — VANCOMYCIN HYDROCHLORIDE 1500 MG: 1.5 INJECTION, POWDER, LYOPHILIZED, FOR SOLUTION INTRAVENOUS at 20:16

## 2023-01-12 RX ADMIN — ENOXAPARIN SODIUM 40 MG: 100 INJECTION SUBCUTANEOUS at 22:54

## 2023-01-12 RX ADMIN — ONDANSETRON 8 MG: 2 INJECTION INTRAMUSCULAR; INTRAVENOUS at 15:43

## 2023-01-12 RX ADMIN — DIPHENHYDRAMINE HYDROCHLORIDE 50 MG: 50 INJECTION, SOLUTION INTRAMUSCULAR; INTRAVENOUS at 20:13

## 2023-01-12 RX ADMIN — CYCLOBENZAPRINE 10 MG: 10 TABLET, FILM COATED ORAL at 22:54

## 2023-01-12 RX ADMIN — METOPROLOL TARTRATE 50 MG: 50 TABLET ORAL at 22:54

## 2023-01-12 RX ADMIN — OXYCODONE HYDROCHLORIDE 10 MG: 10 TABLET ORAL at 22:53

## 2023-01-12 RX ADMIN — GABAPENTIN 800 MG: 400 CAPSULE ORAL at 22:53

## 2023-01-12 RX ADMIN — TRAZODONE HYDROCHLORIDE 200 MG: 100 TABLET ORAL at 22:53

## 2023-01-12 RX ADMIN — SODIUM CHLORIDE, PRESERVATIVE FREE 10 ML: 5 INJECTION INTRAVENOUS at 22:55

## 2023-01-12 RX ADMIN — CLINDAMYCIN PHOSPHATE 600 MG: 600 INJECTION, SOLUTION INTRAVENOUS at 15:25

## 2023-01-12 RX ADMIN — HYDROMORPHONE HYDROCHLORIDE 0.5 MG: 1 INJECTION, SOLUTION INTRAMUSCULAR; INTRAVENOUS; SUBCUTANEOUS at 21:25

## 2023-01-12 RX ADMIN — PIPERACILLIN AND TAZOBACTAM 3375 MG: 3; .375 INJECTION, POWDER, LYOPHILIZED, FOR SOLUTION INTRAVENOUS at 17:31

## 2023-01-12 RX ADMIN — KETOROLAC TROMETHAMINE 30 MG: 30 INJECTION, SOLUTION INTRAMUSCULAR at 14:41

## 2023-01-12 RX ADMIN — FAMOTIDINE 20 MG: 20 TABLET ORAL at 22:54

## 2023-01-12 RX ADMIN — FENTANYL CITRATE 100 MCG: 50 INJECTION, SOLUTION INTRAMUSCULAR; INTRAVENOUS at 15:20

## 2023-01-12 RX ADMIN — LABETALOL HYDROCHLORIDE 10 MG: 5 INJECTION, SOLUTION INTRAVENOUS at 16:01

## 2023-01-12 RX ADMIN — PRAMIPEXOLE DIHYDROCHLORIDE 1.5 MG: 0.5 TABLET ORAL at 22:53

## 2023-01-12 RX ADMIN — HYDROXYZINE HYDROCHLORIDE 25 MG: 25 TABLET, FILM COATED ORAL at 22:53

## 2023-01-12 ASSESSMENT — PAIN DESCRIPTION - ORIENTATION
ORIENTATION: RIGHT;LEFT;LOWER

## 2023-01-12 ASSESSMENT — PAIN DESCRIPTION - DESCRIPTORS
DESCRIPTORS: PRESSURE;SHARP
DESCRIPTORS: SHARP;PRESSURE

## 2023-01-12 ASSESSMENT — PAIN DESCRIPTION - FREQUENCY
FREQUENCY: CONTINUOUS

## 2023-01-12 ASSESSMENT — PAIN - FUNCTIONAL ASSESSMENT: PAIN_FUNCTIONAL_ASSESSMENT: PREVENTS OR INTERFERES SOME ACTIVE ACTIVITIES AND ADLS

## 2023-01-12 ASSESSMENT — PAIN SCALES - GENERAL
PAINLEVEL_OUTOF10: 7
PAINLEVEL_OUTOF10: 6
PAINLEVEL_OUTOF10: 10
PAINLEVEL_OUTOF10: 7
PAINLEVEL_OUTOF10: 10
PAINLEVEL_OUTOF10: 7

## 2023-01-12 ASSESSMENT — PAIN DESCRIPTION - LOCATION
LOCATION: LEG

## 2023-01-12 ASSESSMENT — PAIN DESCRIPTION - ONSET
ONSET: ON-GOING
ONSET: ON-GOING

## 2023-01-12 NOTE — H&P
Pre-diagnosis: Melena, history of PUD    Post diagnosis: Gastritis    Sedation MAC per anesthesia    Procedure and findings: After patient consented and placed on left lateral position and adequately sedated post anesthesia, a gastroscope was inserted in the usual fashion.  This was advanced to the second part of the duodenum and the direct visualization.  The esophagus stomach and the duodenum to the second part were carefully examined.  The GE junction was at 38 cm from incisors.  The esophagus appeared unremarkable.  Some yellow bile was noted in the stomach.  There were patchy and linear erythema noted of the gastric mucosa.  A biopsy was not done.  This has been done in the in the past.  The duodenum to the second part appeared unremarkable.  There was no blood, or bleeding seen.  The air was suctioned out and the scope removed from the patient.    Patient tolerated procedure well and there was no immediate complication.    Recommendations:  Monitor any signs of GI bleeding and H&H  Resume diet  May go home if is stable           Electronically Signed On 05.03.2019 13:05  ___________________________________________________   Dave Garcia MD     Hospital Medicine History & Physical      PCP: Horacio Kohli MD    Date of Admission: 1/12/2023    Chief Complaint:    Chief Complaint   Patient presents with    Leg Swelling     Leg swelling increasing for past month. Has cellulitis in both legs. Dr. Marli Lopez in for eval     History Of Present Illness:    Patient is a 61-year-old female with past medical history of CAD, hypertension hyperlipidemia diabetes mellitus who presents to the hospital for bilateral lower extremity swelling as well as redness. According to the patient it has been going on for about a month, she tried outpatient antibiotics which did not improve her rash. She has a history of similar infections or bleeding legs troponin consult with management and plan. Patient otherwise denied fever chills diarrhea constipation dysuria. Patient mentions she feels nauseated      Past Medical History:          Diagnosis Date    CAD (coronary artery disease)     Cath 09 100% RCA w/ collaterals, nonobstructive dz LCX, LVEF 55%- med tx. Cath 9/27/12- known RCA occlusion, nonobstructive dz of LAD and LCx, LVEF 60%- Med tx. Carotid stenosis     Cellulitis     CHF (congestive heart failure) ([address]     Echo 9/2012 normal    Diabetes ([address]     History of tobacco use     quit 2009    Hyperlipidemia     rec semi annual lipids with LDL goal <70    Hypertension     controlled. Morbid obesity ([address]     weight loss discussed    LAW (obstructive sleep apnea)     Sleep apnea        Past Surgical History:          Procedure Laterality Date    ABDOMINAL SURGERY      CARDIAC CATHETERIZATION  9/27/2012    100% RCA with collaterals    CHOLECYSTECTOMY      COLON SURGERY      ENDOSCOPY, COLON, DIAGNOSTIC      GASTRIC BAND      gastric sleeve    HERNIA REPAIR      LAPAROSCOPIC GASTRIC BANDING      TONSILLECTOMY         Medications Prior to Admission:      Prior to Admission medications    Medication Sig Start Date End Date Taking?  Authorizing Provider   potassium chloride (KLOR-CON M) 10 MEQ extended release tablet Take 10 mEq by mouth 2 times daily   Yes Historical Provider, MD   traZODone (DESYREL) 100 MG tablet Take 200 mg by mouth nightly   Yes Historical Provider, MD   torsemide (DEMADEX) 20 MG tablet Take 20 mg by mouth daily   Yes Historical Provider, MD   venlafaxine (EFFEXOR) 75 MG tablet Take 75 mg by mouth 2 times daily   Yes Historical Provider, MD   hydrOXYzine HCl (ATARAX) 25 MG tablet Take 25 mg by mouth in the morning and at bedtime   Yes Historical Provider, MD   lidocaine (LIDODERM) 5 % Place 1 patch onto the skin daily 12 hours on, 12 hours off. Apply to back   Yes Historical Provider, MD   metoprolol tartrate (LOPRESSOR) 25 MG tablet Take 50 mg by mouth 2 times daily   Yes Historical Provider, MD   nystatin (MYCOSTATIN) 546634 UNIT/GM powder Apply topically as needed (to skin folds) Apply topically 4 times daily. Yes Historical Provider, MD   albuterol sulfate HFA (VENTOLIN HFA) 108 (90 Base) MCG/ACT inhaler Inhale 2 puffs into the lungs every 6 hours as needed for Wheezing   Yes Historical Provider, MD   amitriptyline (ELAVIL) 25 MG tablet Take 25 mg by mouth nightly   Yes Historical Provider, MD   cyclobenzaprine (FLEXERIL) 10 MG tablet Take 10 mg by mouth in the morning and at bedtime   Yes Historical Provider, MD   diclofenac sodium (VOLTAREN) 1 % GEL Apply topically as needed for Pain   Yes Historical Provider, MD   famotidine (PEPCID) 20 MG tablet Take 20 mg by mouth 2 times daily   Yes Historical Provider, MD   gi cocktail Take 30 mLs by mouth daily as needed   Yes Historical Provider, MD   LORazepam (ATIVAN) 1 MG tablet Take 1 mg by mouth daily as needed for Anxiety.    Yes Historical Provider, MD   diphenhydrAMINE (BENADRYL) 25 MG tablet Take 25 mg by mouth daily as needed for Itching   Yes Historical Provider, MD   ondansetron (ZOFRAN-ODT) 4 MG disintegrating tablet Take 1 tablet by mouth 3 times daily as needed for Nausea or Vomiting 7/21/19 Otilio Elliott MD   pramipexole (MIRAPEX) 1.5 MG tablet Take 1.5 mg by mouth 2 times daily    Historical Provider, MD   oxyCODONE HCl (OXY-IR) 10 MG immediate release tablet Take 10 mg by mouth every 6 hours as needed for Pain. Historical Provider, MD   levothyroxine (SYNTHROID) 75 MCG tablet Take 75 mcg by mouth Daily    Historical Provider, MD   cetirizine (ZYRTEC) 10 MG tablet Take 10 mg by mouth daily    Historical Provider, MD   vitamin B-12 (CYANOCOBALAMIN) 1000 MCG tablet Take 1,000 mcg by mouth daily    Historical Provider, MD   amLODIPine (NORVASC) 5 MG tablet Take 5 mg by mouth daily    Historical Provider, MD   Multiple Vitamins-Minerals (THERAPEUTIC MULTIVITAMIN-MINERALS) tablet Take 1 tablet by mouth daily    Historical Provider, MD   atorvastatin (LIPITOR) 80 MG tablet Take 1 tablet by mouth daily  Patient taking differently: Take 80 mg by mouth at bedtime 4/19/17   Joyce Pennington MD   gabapentin (NEURONTIN) 800 MG tablet Take 800 mg by mouth three times daily. Historical Provider, MD   promethazine (PHENERGAN) 25 MG tablet Take 25 mg by mouth every 4 hours as needed for Nausea. Historical Provider, MD   aspirin 81 MG EC tablet Take 1 tablet by mouth daily. 9/8/14   Jake Shoemaker MD       Allergies:  Lortab [hydrocodone-acetaminophen], Vancomycin, Hydrocodone, Lisinopril, Morphine, Adhesive tape, and Sulfa antibiotics    Social History:      TOBACCO:   reports that she quit smoking about 13 years ago. Her smoking use included cigarettes. She smoked an average of 1 pack per day. She has never used smokeless tobacco.  ETOH:   reports no history of alcohol use. Family History:       Reviewed in detail and non contributory          Problem Relation Age of Onset    High Blood Pressure Mother     Heart Disease Father        REVIEW OF SYSTEMS:   Pertinent positives as noted in the HPI. All other systems reviewed and negative.     PHYSICAL EXAM PERFORMED:    BP (!) 150/80   Pulse 70 Temp 97.3 °F (36.3 °C) (Oral)   Resp 17   Ht 5' 5\" (1.651 m)   Wt (!) 360 lb 0.2 oz (163.3 kg)   SpO2 94%   BMI 59.91 kg/m²     General appearance:  No apparent distress, cooperative. HEENT:  Normal cephalic, atraumatic without obvious deformity. Conjunctivae/corneas clear. Neck: Supple, with full range of motion. No cervical lymphadenopathy  Respiratory:  Normal respiratory effort. Clear to auscultation, bilaterally without Rales/Wheezes/Rhonchi. Cardiovascular:  Regular rate and rhythm with normal S1/S2 without murmurs, rubs or gallops. Abdomen: Soft, non-tender, non-distended, normal bowel sounds. Musculoskeletal:  redness b/l lower legs, Swelling notes, +1 edema   Skin: as above rash visible  Neurologic:  Neurologically intact without any focal sensory/motor deficits. grossly non-focal.  Psychiatric:  Alert and oriented, normal mood  Peripheral Pulses: +2 palpable, equal bilaterally       Labs:     Recent Labs     01/12/23  1350   WBC 10.6   HGB 14.7   HCT 44.0        Recent Labs     01/12/23  1350     141   K 4.4  4.3     104   CO2 24  24   BUN 14  14   CREATININE 0.7  0.7   CALCIUM 9.1  9.1     Recent Labs     01/12/23  1350   AST 21   ALT 18   BILITOT 0.3   ALKPHOS 167*     No results for input(s): INR in the last 72 hours.   Recent Labs     01/12/23  1350   TROPONINI <0.01       Urinalysis:      Lab Results   Component Value Date/Time    NITRU Negative 01/12/2023 04:37 PM    [address] 04/05/2015 08:05 PM    RBCUA >100 04/05/2015 08:05 PM    BLOODU Negative 01/12/2023 04:37 PM    SPECGRAV 1.014 01/12/2023 04:37 PM    GLUCOSEU Negative 01/12/2023 04:37 PM       Radiology:       VL Extremity Venous Bilateral                 Active Hospital Problems    Diagnosis Date Noted    Cellulitis [L03.90] 10/21/2015       Patient is a 56-year-old female with past medical history of CAD, hypertension hyperlipidemia diabetes mellitus who presents to the hospital for bilateral lower extremity swelling as well as redness. According to the patient it has been going on for about a month, she tried outpatient antibiotics which did not improve her rash. She has a history of similar infections or bleeding legs troponin consult with management and plan. Patient otherwise denied fever chills diarrhea constipation dysuria. Patient mentions she feels nauseated    Assessment  Bilateral leg cellulitis  Uncontrolled hypertension  CAD  Hypertension  Hyperlipidemia  Diabetes mellitus    Plan  Status post bilateral lower extremity ultrasound performed in ED-negative for DVT  Start IV vancomycin, Zosyn  Check blood cultures, urine culture  Resume home medications  DVT prophylaxix - home coumadin  Diet: No diet orders on file  Code Status: Prior    PT/OT Eval Status: ordered    Dispo - pending clinical improvement       Casimiro Paget, MD    The note was completed using EMR and Dragon dictation system. Every effort was made to ensure accuracy; however, inadvertent computerized transcription errors may be present. Thank you Rica Ramon MD for the opportunity to be involved in this patient's care. If you have any questions or concerns please feel free to contact me at [phone].     Casimiro Paget, MD

## 2023-01-12 NOTE — ED PROVIDER NOTES
[address] [facility]      Pt Name: Leticia Sandy  MRN: [id number]  [place] 1970  Date of evaluation: 1/12/2023  Provider: Gregorio Aranda, [address] [place] [address]  Chief Complaint   Patient presents with    Leg Swelling     Leg swelling increasing for past month. Has cellulitis in both legs. Dr. Espinosa Files in for eval         This patient is at risk for a communicable infection. Therefore, personal protection equipment consisting of a mask was worn for the exam.    HPI  Leticia Sandy is a 50 y.o. female who presents with bilateral leg swellings been present for 1 month. She was seen in this emergency department and treated on cephalexin but did not get any better. She denies any fevers but has had chills for 2 days. She has been nauseated. She is not vomiting had diarrhea. She is not on any immunosuppressive drugs. She had Doppler study of her legs at [facility] but that was -1-month ago. She denies any follow-up. She does have a history of similar symptoms and had to be admitted to the hospital for IV antibiotics. She denies being diabetic. Nothing makes it better or worse. She describes it as severe. She can no longer walk due to the pain. She has been taking Tylenol for pain at home. ? REVIEW OF SYSTEMS  All systems negative except as noted in the HPI. Reviewed Nurses' notes and concur. No LMP recorded. PAST MEDICAL HISTORY  Past Medical History:   Diagnosis Date    CAD (coronary artery disease)     Cath 09 100% RCA w/ collaterals, nonobstructive dz LCX, LVEF 55%- med tx. Cath 9/27/12- known RCA occlusion, nonobstructive dz of LAD and LCx, LVEF 60%- Med tx. Carotid stenosis     Cellulitis     CHF (congestive heart failure) [facility]     Echo 9/2012 normal    Diabetes ([facility]     History of tobacco use     quit 2009    Hyperlipidemia     rec semi annual lipids with LDL goal <70    Hypertension     controlled.     Morbid obesity ([address]     weight loss discussed    LAW (obstructive sleep apnea)     Sleep apnea        FAMILY HISTORY  Family History   Problem Relation Age of Onset    High Blood Pressure Mother     Heart Disease Father        SOCIAL HISTORY   reports that she quit smoking about 13 years ago. Her smoking use included cigarettes. She smoked an average of 1 pack per day. She has never used smokeless tobacco. She reports that she does not drink alcohol and does not use drugs. SURGICAL HISTORY  Past Surgical History:   Procedure Laterality Date    ABDOMINAL SURGERY      CARDIAC CATHETERIZATION  9/27/2012    100% RCA with collaterals    CHOLECYSTECTOMY      COLON SURGERY      ENDOSCOPY, COLON, DIAGNOSTIC      GASTRIC BAND      gastric sleeve    HERNIA REPAIR      LAPAROSCOPIC GASTRIC BANDING      TONSILLECTOMY         CURRENT MEDICATIONS  Current Outpatient Rx   Medication Sig Dispense Refill    potassium chloride (KLOR-CON M) 10 MEQ extended release tablet Take 10 mEq by mouth 2 times daily      traZODone (DESYREL) 100 MG tablet Take 200 mg by mouth nightly      torsemide (DEMADEX) 20 MG tablet Take 20 mg by mouth daily      venlafaxine (EFFEXOR) 75 MG tablet Take 75 mg by mouth 2 times daily      hydrOXYzine HCl (ATARAX) 25 MG tablet Take 25 mg by mouth in the morning and at bedtime      lidocaine (LIDODERM) 5 % Place 1 patch onto the skin daily 12 hours on, 12 hours off. Apply to back      metoprolol tartrate (LOPRESSOR) 25 MG tablet Take 50 mg by mouth 2 times daily      nystatin (MYCOSTATIN) 108225 UNIT/GM powder Apply topically as needed (to skin folds) Apply topically 4 times daily.       albuterol sulfate HFA (VENTOLIN HFA) 108 (90 Base) MCG/ACT inhaler Inhale 2 puffs into the lungs every 6 hours as needed for Wheezing      amitriptyline (ELAVIL) 25 MG tablet Take 25 mg by mouth nightly      cyclobenzaprine (FLEXERIL) 10 MG tablet Take 10 mg by mouth in the morning and at bedtime      diclofenac sodium (VOLTAREN) 1 % GEL Apply topically as needed for Pain      famotidine (PEPCID) 20 MG tablet Take 20 mg by mouth 2 times daily      gi cocktail Take 30 mLs by mouth daily as needed      LORazepam (ATIVAN) 1 MG tablet Take 1 mg by mouth daily as needed for Anxiety. diphenhydrAMINE (BENADRYL) 25 MG tablet Take 25 mg by mouth daily as needed for Itching      ondansetron (ZOFRAN-ODT) 4 MG disintegrating tablet Take 1 tablet by mouth 3 times daily as needed for Nausea or Vomiting 30 tablet 0    pramipexole (MIRAPEX) 1.5 MG tablet Take 1.5 mg by mouth 2 times daily      oxyCODONE HCl (OXY-IR) 10 MG immediate release tablet Take 10 mg by mouth every 6 hours as needed for Pain.      levothyroxine (SYNTHROID) 75 MCG tablet Take 75 mcg by mouth Daily      cetirizine (ZYRTEC) 10 MG tablet Take 10 mg by mouth daily      vitamin B-12 (CYANOCOBALAMIN) 1000 MCG tablet Take 1,000 mcg by mouth daily      amLODIPine (NORVASC) 5 MG tablet Take 5 mg by mouth daily      Multiple Vitamins-Minerals (THERAPEUTIC MULTIVITAMIN-MINERALS) tablet Take 1 tablet by mouth daily      atorvastatin (LIPITOR) 80 MG tablet Take 1 tablet by mouth daily (Patient taking differently: Take 80 mg by mouth at bedtime) 30 tablet 5    gabapentin (NEURONTIN) 800 MG tablet Take 800 mg by mouth three times daily. promethazine (PHENERGAN) 25 MG tablet Take 25 mg by mouth every 4 hours as needed for Nausea. aspirin 81 MG EC tablet Take 1 tablet by mouth daily. ALLERGIES  Allergies   Allergen Reactions    Lortab [Hydrocodone-Acetaminophen] Hives    Vancomycin Itching    Hydrocodone Hives     Dilaudid okay. From lortab.  Has taken vicodin w/o issue per patient      Lisinopril Other (See Comments)     cough    Morphine Itching    Adhesive Tape Hives    Sulfa Antibiotics Hives       PHYSICAL EXAM  VITAL SIGNS: BP (!) 150/80   Pulse 70   Temp 97.3 °F (36.3 °C) (Oral)   Resp 17   Ht 5' 5\" (1.651 m)   Wt (!) 360 lb 0.2 oz (163.3 kg)   SpO2 94%   BMI 59.91 kg/m² Constitutional: Well-developed, well-nourished, appears normal, nontoxic, activity: Resting comfortably on the chair in [address]. Does not appear ill or toxic in the room. She does appear in pain when I palpate her bilateral extremities  Skin: Warm, Dry, moderate bilateral lower extremity erythema from the hip to the feet, No other rash. no Lacerations, no Abrasions. Back: No tenderness, Full range of motion, No scoliosis. Extremities:  neurovascular intact, no deformity, moderate bilateral lower extremity edema and swelling, moderate bilateral lower extremity erythema, no lacerations noted, no amputations, no cyanosis, no mottling, no ecchymosis, severe tenderness of bilateral lower extremities, capillary refill less than 2 seconds. Musculoskeletal: No amputations or gross deformities are noted  Neurologic: Alert & oriented x 3, Normal motor function, Normal sensory function, No focal deficits noted. Psychiatric: Anxious, Judgment normal, Mood normal, no confusion. COURSE & MEDICAL DECISION MAKING  Pertinent Labs & Imaging studies reviewed. (See chart for details)    LABORATORY  Labs Reviewed   CBC WITH AUTO DIFFERENTIAL - Abnormal; Notable for the following components:       Result Value    Neutrophils Absolute 8.1 (*)     All other components within normal limits   BASIC METABOLIC PANEL W/ REFLEX TO MG FOR LOW K - Abnormal; Notable for the following components:    Glucose 101 (*)     All other components within normal limits   COMPREHENSIVE METABOLIC PANEL W/ REFLEX TO MG FOR LOW K - Abnormal; Notable for the following components:    Glucose 103 (*)     Alkaline Phosphatase 167 (*)     All other components within normal limits   CULTURE, BLOOD 1   CULTURE, BLOOD 2   HCG, SERUM, QUALITATIVE   BRAIN NATRIURETIC PEPTIDE   TROPONIN   LACTIC ACID   URINALYSIS WITH REFLEX TO CULTURE   TSH   T4, FREE       RADIOLOGY/PROCEDURES  I personally reviewed the images for this case.   VL Extremity Venous Bilateral Vitals:    01/12/23 1615 01/12/23 1616 01/12/23 1617 01/12/23 1630   BP: 136/64   (!) 150/80   Pulse: 65 66 65 70   Resp: 22 20 19 17   Temp:       TempSrc:       SpO2: 94% 92% 93% 94%   Weight:       Height:           Medications   HYDROmorphone (DILAUDID) injection 1 mg (has no administration in time range)   piperacillin-tazobactam (ZOSYN) 3,375 mg in dextrose 5 % 50 mL IVPB (mini-bag) (has no administration in time range)   labetalol (NORMODYNE;TRANDATE) injection 10 mg (10 mg IntraVENous Given 1/12/23 1601)   clindamycin (CLEOCIN) 600 mg in dextrose 5 % 50 mL IVPB (0 mg IntraVENous Stopped 1/12/23 1556)   fentaNYL (SUBLIMAZE) injection 100 mcg (100 mcg IntraVENous Given 1/12/23 1520)   ketorolac (TORADOL) injection 30 mg (30 mg IntraVENous Given 1/12/23 1441)   ondansetron (ZOFRAN) injection 8 mg (8 mg IntraVENous Given 1/12/23 1543)       New Prescriptions    No medications on file       SEP-1 CORE MEASURE DATA  Exclusion criteria: the patient is NOT to be included for sepsis due to:  SIRS criteria are not met    Patient remained stable in the ED. her white count was normal.  Doppler study of her legs was negative. Glucose was 103. Patient was given clindamycin in the emergency department because she stated that she was on cephalexin 2 weeks ago. However, she changed her story and stated she was on clindamycin 2 weeks ago. Therefore, vancomycin as dosed per pharmacy and Zosyn 3.375 mg were ordered for her infection. Patient was admitted to the hospital for further evaluation and treatment. She was given fentanyl 50 mg IV then fentanyl 100 mcg IV. She was also given ketorolac 30 mg IV and Zofran 8 mg IV. She was also given Dilaudid for her final dose. Patient is on Percocet 10 mg 3 times a day at home. I do not feel I can control her pain at home. And has failed outpatient therapy. Therefore, she needs admitted to the hospital for further evaluation and treatment.     Diagnostic considerations include but are not limited to:  Necrotizing fasciitis, cellulitis, erysipelas, suppurative thrombophlebitis, aseptic superficial thrombophlebitis, DVT, gout, compartment syndrome, erythema migrans (lyme disease), contact dermatitis, lymphedema, other      EKG-ordered to rule out myocardial infarction, rhythm disturbances, conduction disturbances, LVH, MCKENNA, pericarditis, voltage abnormalities, or other pathology that might be causing the patient's symptoms. Chest x-ray-chest x-ray was ordered to rule out pneumonia, pneumothorax, congestive heart failure, cardiomegaly, chest masses, aortic aneurysm, hiatal hernia, rib fractures, or any other pathology that might be causing the patient's symptoms    CBC-CBC was ordered to rule out anemia, infection, abnormal platelet count, polycythemia, abnormal Red cell pathology, or any other pathology that might be causing the patient's symptoms    CMP-CMP was ordered to rule out electrolyte abnormalities, liver dysfunction, kidney dysfunction, electrolyte imbalance, abnormal transaminases, or any other pathology that might be causing the patient's symptoms. Urine-urinalysis was ordered to rule out infection, renal failure, dehydration, pregnancy, proteinuria, bilirubinuria, or any other pathology that might be causing the patient's symptoms    The patient's blood pressure was found to be elevated according to CMS/Medicare and the Affordable Care Act/[facility] criteria. Elevated blood pressure could occur because of pain or anxiety or other reasons and does not mean that they need to have their blood pressure treated or medications otherwise adjusted. However, this could also be a sign that they will need to have their blood pressure treated or medications changed. The patient was instructed to follow up closely with their personal physician to have their blood pressure rechecked.  The patient was instructed to take a list of recent blood pressure readings to their next visit with their personal physician. I reviewed old records     (This chart has been completed using [address]. Although attempts have been made to ensure accuracy, words and/or phrases may not be transcribed as intended.)    Patient requested pain medicines at the time of her exam.    IMPRESSION(S):  1. Bilateral lower leg cellulitis    2. Elevated blood pressure reading    3. Bilateral lower extremity edema    4. Failure of outpatient treatment        ?   Recheck Times: [address] Time: 30 minutes       Lius Alberto Olivas DO  01/12/23 5133

## 2023-01-12 NOTE — CONSULTS
Clinical Pharmacy Note  Vancomycin Consult    Pharmacy consult received for one-time dose of vancomycin in the Emergency Department per Dr. Des Ruiz. Ht Readings from Last 1 Encounters:   01/12/23 5' 5\" (1.651 m)        Wt Readings from Last 1 Encounters:   01/12/23 (!) 360 lb 0.2 oz (163.3 kg)         Assessment/Plan:  Vancomycin 1500mg x 1 in ED. If Vancomycin is to continue on admission and pharmacy is to manage dosing, please re-consult with admission orders.       Nely King [facility]  1/12/2023 5:32 PM

## 2023-01-12 NOTE — PROGRESS NOTES
Medication Reconciliation    List of medications patient is currently taking is complete. Source of information: 1.  Conversation with patient and family at bedside                                      2. EPIC records      Allergies  Lortab [hydrocodone-acetaminophen], Vancomycin, Hydrocodone, Lisinopril, Adhesive tape, and Sulfa antibiotics

## 2023-01-13 ENCOUNTER — APPOINTMENT (OUTPATIENT)
Dept: GENERAL RADIOLOGY | Age: 53
DRG: 638 | End: 2023-01-13
Payer: MEDICARE

## 2023-01-13 LAB
ANION GAP SERPL CALCULATED.3IONS-SCNC: 8 MMOL/L (ref 3–16)
BASOPHILS ABSOLUTE: 0.1 K/UL (ref 0–0.2)
BASOPHILS RELATIVE PERCENT: 1.2 %
BLOOD CULTURE, ROUTINE: NORMAL
BUN BLDV-MCNC: 20 MG/DL (ref 7–20)
CALCIUM SERPL-MCNC: 8.7 MG/DL (ref 8.3–10.6)
CHLORIDE BLD-SCNC: 99 MMOL/L (ref 99–110)
CO2: 30 MMOL/L (ref 21–32)
CREAT SERPL-MCNC: 1 MG/DL (ref 0.6–1.1)
CULTURE, BLOOD 2: NORMAL
EKG ATRIAL RATE: 63 BPM
EKG DIAGNOSIS: NORMAL
EKG P AXIS: 37 DEGREES
EKG P-R INTERVAL: 180 MS
EKG Q-T INTERVAL: 436 MS
EKG QRS DURATION: 90 MS
EKG QTC CALCULATION (BAZETT): 446 MS
EKG R AXIS: 80 DEGREES
EKG T AXIS: 54 DEGREES
EKG VENTRICULAR RATE: 63 BPM
EOSINOPHILS ABSOLUTE: 0.2 K/UL (ref 0–0.6)
EOSINOPHILS RELATIVE PERCENT: 2.3 %
GFR SERPL CREATININE-BSD FRML MDRD: >60 ML/MIN/{1.73_M2}
GLUCOSE BLD-MCNC: 112 MG/DL (ref 70–99)
HCT VFR BLD CALC: 42.9 % (ref 36–48)
HEMOGLOBIN: 14 G/DL (ref 12–16)
LYMPHOCYTES ABSOLUTE: 1.3 K/UL (ref 1–5.1)
LYMPHOCYTES RELATIVE PERCENT: 14.5 %
MCH RBC QN AUTO: 30.7 PG (ref 26–34)
MCHC RBC AUTO-ENTMCNC: 32.5 G/DL (ref 31–36)
MCV RBC AUTO: 94.3 FL (ref 80–100)
MONOCYTES ABSOLUTE: 0.7 K/UL (ref 0–1.3)
MONOCYTES RELATIVE PERCENT: 8.3 %
NEUTROPHILS ABSOLUTE: 6.5 K/UL (ref 1.7–7.7)
NEUTROPHILS RELATIVE PERCENT: 73.7 %
PDW BLD-RTO: 14.4 % (ref 12.4–15.4)
PLATELET # BLD: 262 K/UL (ref 135–450)
PMV BLD AUTO: 8.4 FL (ref 5–10.5)
POTASSIUM REFLEX MAGNESIUM: 4.1 MMOL/L (ref 3.5–5.1)
RBC # BLD: 4.55 M/UL (ref 4–5.2)
SODIUM BLD-SCNC: 137 MMOL/L (ref 136–145)
WBC # BLD: 8.9 K/UL (ref 4–11)

## 2023-01-13 PROCEDURE — 2580000003 HC RX 258: Performed by: INTERNAL MEDICINE

## 2023-01-13 PROCEDURE — 94761 N-INVAS EAR/PLS OXIMETRY MLT: CPT

## 2023-01-13 PROCEDURE — 6360000002 HC RX W HCPCS: Performed by: INTERNAL MEDICINE

## 2023-01-13 PROCEDURE — 36415 COLL VENOUS BLD VENIPUNCTURE: CPT

## 2023-01-13 PROCEDURE — 80048 BASIC METABOLIC PNL TOTAL CA: CPT

## 2023-01-13 PROCEDURE — 6370000000 HC RX 637 (ALT 250 FOR IP): Performed by: INTERNAL MEDICINE

## 2023-01-13 PROCEDURE — 97161 PT EVAL LOW COMPLEX 20 MIN: CPT

## 2023-01-13 PROCEDURE — 97165 OT EVAL LOW COMPLEX 30 MIN: CPT

## 2023-01-13 PROCEDURE — 97530 THERAPEUTIC ACTIVITIES: CPT

## 2023-01-13 PROCEDURE — 71046 X-RAY EXAM CHEST 2 VIEWS: CPT

## 2023-01-13 PROCEDURE — 97116 GAIT TRAINING THERAPY: CPT

## 2023-01-13 PROCEDURE — 85025 COMPLETE CBC W/AUTO DIFF WBC: CPT

## 2023-01-13 PROCEDURE — 93010 ELECTROCARDIOGRAM REPORT: CPT | Performed by: INTERNAL MEDICINE

## 2023-01-13 PROCEDURE — 93005 ELECTROCARDIOGRAM TRACING: CPT | Performed by: INTERNAL MEDICINE

## 2023-01-13 PROCEDURE — 1200000000 HC SEMI PRIVATE

## 2023-01-13 RX ORDER — METOCLOPRAMIDE HYDROCHLORIDE 5 MG/ML
10 INJECTION INTRAMUSCULAR; INTRAVENOUS EVERY 6 HOURS PRN
Status: DISCONTINUED | OUTPATIENT
Start: 2023-01-13 | End: 2023-01-14 | Stop reason: HOSPADM

## 2023-01-13 RX ORDER — LANOLIN ALCOHOL/MO/W.PET/CERES
3 CREAM (GRAM) TOPICAL NIGHTLY
Status: DISCONTINUED | OUTPATIENT
Start: 2023-01-13 | End: 2023-01-14 | Stop reason: HOSPADM

## 2023-01-13 RX ORDER — KETOROLAC TROMETHAMINE 15 MG/ML
15 INJECTION, SOLUTION INTRAMUSCULAR; INTRAVENOUS ONCE
Status: COMPLETED | OUTPATIENT
Start: 2023-01-13 | End: 2023-01-13

## 2023-01-13 RX ORDER — CEPHALEXIN 500 MG/1
500 CAPSULE ORAL EVERY 12 HOURS SCHEDULED
Status: DISCONTINUED | OUTPATIENT
Start: 2023-01-13 | End: 2023-01-14 | Stop reason: HOSPADM

## 2023-01-13 RX ADMIN — GABAPENTIN 800 MG: 400 CAPSULE ORAL at 08:28

## 2023-01-13 RX ADMIN — METOPROLOL TARTRATE 50 MG: 50 TABLET ORAL at 08:28

## 2023-01-13 RX ADMIN — CYCLOBENZAPRINE 10 MG: 10 TABLET, FILM COATED ORAL at 20:22

## 2023-01-13 RX ADMIN — GABAPENTIN 800 MG: 400 CAPSULE ORAL at 20:22

## 2023-01-13 RX ADMIN — HYDROXYZINE HYDROCHLORIDE 25 MG: 25 TABLET, FILM COATED ORAL at 08:28

## 2023-01-13 RX ADMIN — HYDROMORPHONE HYDROCHLORIDE 0.5 MG: 1 INJECTION, SOLUTION INTRAMUSCULAR; INTRAVENOUS; SUBCUTANEOUS at 06:01

## 2023-01-13 RX ADMIN — DIPHENHYDRAMINE HCL 25 MG: 25 TABLET ORAL at 17:37

## 2023-01-13 RX ADMIN — HYDROMORPHONE HYDROCHLORIDE 0.5 MG: 1 INJECTION, SOLUTION INTRAMUSCULAR; INTRAVENOUS; SUBCUTANEOUS at 20:24

## 2023-01-13 RX ADMIN — SODIUM CHLORIDE: 9 INJECTION, SOLUTION INTRAVENOUS at 08:05

## 2023-01-13 RX ADMIN — HYDROMORPHONE HYDROCHLORIDE 0.5 MG: 1 INJECTION, SOLUTION INTRAMUSCULAR; INTRAVENOUS; SUBCUTANEOUS at 00:34

## 2023-01-13 RX ADMIN — LEVOTHYROXINE SODIUM 75 MCG: 0.07 TABLET ORAL at 06:44

## 2023-01-13 RX ADMIN — ONDANSETRON 4 MG: 2 INJECTION INTRAMUSCULAR; INTRAVENOUS at 07:28

## 2023-01-13 RX ADMIN — HYDROMORPHONE HYDROCHLORIDE 0.5 MG: 1 INJECTION, SOLUTION INTRAMUSCULAR; INTRAVENOUS; SUBCUTANEOUS at 16:22

## 2023-01-13 RX ADMIN — TRAZODONE HYDROCHLORIDE 200 MG: 100 TABLET ORAL at 20:23

## 2023-01-13 RX ADMIN — VANCOMYCIN HYDROCHLORIDE 1250 MG: 1.25 INJECTION, POWDER, LYOPHILIZED, FOR SOLUTION INTRAVENOUS at 08:26

## 2023-01-13 RX ADMIN — HYDROXYZINE HYDROCHLORIDE 25 MG: 25 TABLET, FILM COATED ORAL at 20:23

## 2023-01-13 RX ADMIN — OXYCODONE HYDROCHLORIDE 10 MG: 10 TABLET ORAL at 17:37

## 2023-01-13 RX ADMIN — SODIUM CHLORIDE, PRESERVATIVE FREE 10 ML: 5 INJECTION INTRAVENOUS at 08:37

## 2023-01-13 RX ADMIN — GABAPENTIN 800 MG: 400 CAPSULE ORAL at 14:23

## 2023-01-13 RX ADMIN — METOCLOPRAMIDE 10 MG: 5 INJECTION, SOLUTION INTRAMUSCULAR; INTRAVENOUS at 18:24

## 2023-01-13 RX ADMIN — METOCLOPRAMIDE 10 MG: 5 INJECTION, SOLUTION INTRAMUSCULAR; INTRAVENOUS at 09:44

## 2023-01-13 RX ADMIN — CETIRIZINE HYDROCHLORIDE 10 MG: 10 TABLET, FILM COATED ORAL at 08:28

## 2023-01-13 RX ADMIN — Medication 3 MG: at 20:23

## 2023-01-13 RX ADMIN — PIPERACILLIN AND TAZOBACTAM 3375 MG: 3; .375 INJECTION, POWDER, LYOPHILIZED, FOR SOLUTION INTRAVENOUS at 00:41

## 2023-01-13 RX ADMIN — DIPHENHYDRAMINE HYDROCHLORIDE 25 MG: 50 INJECTION, SOLUTION INTRAMUSCULAR; INTRAVENOUS at 07:57

## 2023-01-13 RX ADMIN — ENOXAPARIN SODIUM 40 MG: 100 INJECTION SUBCUTANEOUS at 20:23

## 2023-01-13 RX ADMIN — HYDROMORPHONE HYDROCHLORIDE 0.5 MG: 1 INJECTION, SOLUTION INTRAMUSCULAR; INTRAVENOUS; SUBCUTANEOUS at 13:15

## 2023-01-13 RX ADMIN — ONDANSETRON 4 MG: 2 INJECTION INTRAMUSCULAR; INTRAVENOUS at 00:34

## 2023-01-13 RX ADMIN — CEPHALEXIN 500 MG: 500 CAPSULE ORAL at 09:44

## 2023-01-13 RX ADMIN — ASPIRIN 81 MG: 81 TABLET, COATED ORAL at 08:27

## 2023-01-13 RX ADMIN — KETOROLAC TROMETHAMINE 15 MG: 15 INJECTION, SOLUTION INTRAMUSCULAR; INTRAVENOUS at 19:10

## 2023-01-13 RX ADMIN — OXYCODONE HYDROCHLORIDE 10 MG: 10 TABLET ORAL at 08:33

## 2023-01-13 RX ADMIN — AMITRIPTYLINE HYDROCHLORIDE 25 MG: 25 TABLET, FILM COATED ORAL at 20:23

## 2023-01-13 RX ADMIN — ATORVASTATIN CALCIUM 80 MG: 80 TABLET, FILM COATED ORAL at 20:23

## 2023-01-13 RX ADMIN — FAMOTIDINE 20 MG: 20 TABLET ORAL at 08:37

## 2023-01-13 RX ADMIN — HYDROMORPHONE HYDROCHLORIDE 0.5 MG: 1 INJECTION, SOLUTION INTRAMUSCULAR; INTRAVENOUS; SUBCUTANEOUS at 09:45

## 2023-01-13 RX ADMIN — PRAMIPEXOLE DIHYDROCHLORIDE 1.5 MG: 0.5 TABLET ORAL at 20:22

## 2023-01-13 RX ADMIN — TORSEMIDE 20 MG: 20 TABLET ORAL at 08:28

## 2023-01-13 RX ADMIN — PRAMIPEXOLE DIHYDROCHLORIDE 1.5 MG: 0.5 TABLET ORAL at 08:28

## 2023-01-13 RX ADMIN — AMLODIPINE BESYLATE 5 MG: 5 TABLET ORAL at 08:27

## 2023-01-13 RX ADMIN — SODIUM CHLORIDE, PRESERVATIVE FREE 10 ML: 5 INJECTION INTRAVENOUS at 20:25

## 2023-01-13 RX ADMIN — CEPHALEXIN 500 MG: 500 CAPSULE ORAL at 20:23

## 2023-01-13 RX ADMIN — ENOXAPARIN SODIUM 40 MG: 100 INJECTION SUBCUTANEOUS at 08:27

## 2023-01-13 RX ADMIN — CYCLOBENZAPRINE 10 MG: 10 TABLET, FILM COATED ORAL at 08:28

## 2023-01-13 RX ADMIN — FAMOTIDINE 20 MG: 20 TABLET ORAL at 20:23

## 2023-01-13 RX ADMIN — METOPROLOL TARTRATE 50 MG: 50 TABLET ORAL at 20:27

## 2023-01-13 ASSESSMENT — PAIN DESCRIPTION - LOCATION
LOCATION: LEG

## 2023-01-13 ASSESSMENT — PAIN DESCRIPTION - ORIENTATION
ORIENTATION: RIGHT;LEFT
ORIENTATION: RIGHT;LEFT
ORIENTATION: LOWER
ORIENTATION: LOWER;LEFT;RIGHT
ORIENTATION: RIGHT;LEFT
ORIENTATION: RIGHT;LEFT
ORIENTATION: RIGHT;LEFT;MID
ORIENTATION: RIGHT;LEFT
ORIENTATION: LEFT;RIGHT;LOWER

## 2023-01-13 ASSESSMENT — PAIN SCALES - GENERAL
PAINLEVEL_OUTOF10: 7
PAINLEVEL_OUTOF10: 5

## 2023-01-13 ASSESSMENT — PAIN DESCRIPTION - DESCRIPTORS
DESCRIPTORS: ACHING;BURNING;DISCOMFORT
DESCRIPTORS: ACHING;BURNING;CRAMPING;DISCOMFORT

## 2023-01-13 NOTE — PROGRESS NOTES
Pt Admitted to 4W from ED. Vitals obtained on arrival and stable. Admission and Head-to-Toe assessment completed. Medications administered per order. Emesis x1. Ondansetron administered. Pt currently resting in bed. Call light in reach.    Vin Messer RN

## 2023-01-13 NOTE — PROGRESS NOTES
Occupational Therapy  Facility/Department: [facility] MED SURG  Occupational Therapy Initial Assessment and Discharge    Name: Jn Willis  : 1970  MRN: [id number]  Date of Service: 2023    Discharge Recommendations:  Home with assist PRN  OT Equipment Recommendations  Equipment Needed: No     Jn Willis scored a 22/24 on the AM-PAC ADL Inpatient form. At this time, no further OT is recommended upon discharge due to pt functioning at/near baseline. Recommend patient returns to prior setting with prior services. Patient Diagnosis(es): The primary encounter diagnosis was Bilateral lower leg cellulitis. Diagnoses of Elevated blood pressure reading, Bilateral lower extremity edema, and Failure of outpatient treatment were also pertinent to this visit. Past Medical History:  has a past medical history of CAD (coronary artery disease), Carotid stenosis, Cellulitis, CHF (congestive heart failure) ([facility] [address] Diabetes ([facility] [address] History of tobacco use, Hyperlipidemia, Hypertension, Morbid obesity ([facility] [address] LAW (obstructive sleep apnea), and Sleep apnea. Past Surgical History:  has a past surgical history that includes Lap Band; Cholecystectomy; hernia repair; Abdomen surgery; Tonsillectomy; Endoscopy, colon, diagnostic; Cardiac catheterization (2012); Gastric Band; and Colon surgery. Assessment   Assessment: Pt presents to be functioning at/near baseline, and does not require any additional acute OT. Pt UAL in room completing ADLs with mod I, denies need for OT services. No acute OT goals identified, will discharge. Anticipate pt will be safe to return home with assist prn.   Prognosis: Good  Decision Making: Low Complexity  History: see above  REQUIRES OT FOLLOW-UP: No  Activity Tolerance  Activity Tolerance: Patient Tolerated treatment well        Plan   Occupational Therapy Plan  Times Per Week: d/c acute OT     Restrictions  Restrictions/Precautions  Restrictions/Precautions: Up Ad Janel    Subjective   General  Chart Reviewed: Yes  Additional Pertinent Hx: Per Shelton Hickman MD's H&P:\" Patient is a 82-year-old female with past medical history of CAD, hypertension hyperlipidemia diabetes mellitus who presents to the hospital for bilateral lower extremity swelling as well as redness. According to the patient it has been going on for about a month, she tried outpatient antibiotics which did not improve her rash. She has a history of similar infections or bleeding legs troponin consult with management and plan. Patient otherwise denied fever chills diarrhea constipation dysuria. Patient mentions she feels nauseated. \" Pt admitted with B LE cellulitis. B LE ultrasound performed in ED-negative for DVT  Family / Caregiver Present: No  Referring Practitioner: Shelton Hickman MD  Diagnosis: B LE cellulitis  Subjective  Subjective: Pt met b/s for OT eval/tx. Pt in bed on arrival, agreeable to participate in therapy. Pt reports 6/10 pain B LEs. Pt reports UAL in room completing ADLs independently.      Social/Functional History  Social/Functional History  Lives With: Spouse  Type of Home: House  Home Layout: One level (ranch with basement, laundry upstairs)  Home Access: Stairs to enter without rails  Entrance Stairs - Number of Steps: 2  Bathroom Shower/Tub: Walk-in shower  Bathroom Toilet: Handicap height  Bathroom Equipment: Grab bars in shower, Built-in shower seat, Grab bars around toilet, 3-in-1 commode  Bathroom Accessibility: Accessible  Home Equipment: Walker, rolling, Wheelchair-manual, Rollator, Reacher, Long-handled shoehorn, Sock aid (toileting aid)  Has the patient had two or more falls in the past year or any fall with injury in the past year?: No  ADL Assistance: Independent  Homemaking Assistance: Independent  Ambulation Assistance: Independent  Transfer Assistance: Independent  Active : No  Patient's  Info: pt able to drive, but  drives mostly d/t pt hx of syncopal episodes  Occupation: On disability  IADL Comments: pt sleeps in regular bed       Objective       Safety Devices  Type of Devices: Call light within reach; Left in bed (the pt is up ad abel)    Balance  Sitting: Intact  Standing: Intact (mod I)  Gait  Overall Level of Assistance: Modified independent (Pt completed fxl mobility ~20 ft with mod I. Pt slow d/t B LE pain, but no LOB.)  Assistive Device: Gait belt    AROM: Within functional limits  PROM: Within functional limits  Strength: Within functional limits  Coordination: Within functional limits  Tone: Normal  Sensation: Intact    ADL  LE Dressing: Modified independent   LE Dressing Skilled Clinical Factors: to don slip on shoes  Additional Comments: Pt UAL in room completing ADLs with mod I. Activity Tolerance  Activity Tolerance: Patient tolerated evaluation without incident    Transfers  Sit to stand: Independent  Stand to sit: Independent    Vision  Vision: Impaired  Vision Exceptions: Wears glasses for reading  Hearing  Hearing: Within functional limits    Cognition  Overall Cognitive Status: WFL  Orientation  Overall Orientation Status: Within Functional Limits    Education Given To: Patient  Education Provided: Role of Therapy  Barriers to Learning: None  Education Outcome: Verbalized understanding;Demonstrated understanding                          AM-PAC Score        AM-[facility] Inpatient Daily Activity Raw Score: 22 (01/13/23 1139)  AM-PAC Inpatient ADL T-Scale Score : 47.1 (01/13/23 1139)  ADL Inpatient CMS 0-100% Score: 25.8 (01/13/23 1139)  ADL Inpatient CMS G-Code Modifier : CJ (01/13/23 1139)           Goals  Short Term Goals  Time Frame for Short Term Goals: no acute OT goals identified.        Therapy Time   Individual Concurrent Group Co-treatment   Time In 1110         Time Out 1139         Minutes 29         Timed Code Treatment Minutes: 229 [facility], OTR/L 1514

## 2023-01-13 NOTE — ED NOTES
Report called to [facility] on [address] [address]. Transportation requested.      Aden Delong RN  01/12/23 2055

## 2023-01-13 NOTE — PROGRESS NOTES
Hospitalist Progress Note      PCP: Romel Benton MD    Date of Admission: 1/12/2023    Chief Complaint:   Chief Complaint   Patient presents with    Leg Swelling     Leg swelling increasing for past month. Has cellulitis in both legs. Dr. Osmar Floyd in for [facility] Course: Patient is a 61-year-old female with past medical history of CAD, hypertension hyperlipidemia diabetes mellitus who presents to the hospital for bilateral lower extremity swelling as well as redness. According to the patient it has been going on for about a month, she tried outpatient antibiotics which did not improve her rash. She has a history of similar infections or bleeding legs troponin consult with management and plan. Patient otherwise denied fever chills diarrhea constipation dysuria.   Patient mentions she feels nauseated        Subjective: feeling better       Medications:  Reviewed    Infusion Medications    sodium chloride 20 mL/hr at 01/13/23 0805     Scheduled Medications    vancomycin  1,250 mg IntraVENous Q12H    sodium chloride flush  10 mL IntraVENous 2 times per day    amitriptyline  25 mg Oral Nightly    amLODIPine  5 mg Oral Daily    aspirin  81 mg Oral Daily    atorvastatin  80 mg Oral Nightly    cetirizine  10 mg Oral Daily    cyclobenzaprine  10 mg Oral BID    famotidine  20 mg Oral BID    gabapentin  800 mg Oral TID    hydrOXYzine HCl  25 mg Oral BID    levothyroxine  75 mcg Oral Daily    metoprolol tartrate  50 mg Oral BID    pramipexole  1.5 mg Oral BID    torsemide  20 mg Oral Daily    traZODone  200 mg Oral Nightly    piperacillin-tazobactam  3,375 mg IntraVENous Q8H    enoxaparin  40 mg SubCUTAneous BID    vancomycin (VANCOCIN) intermittent dosing (placeholder)   Other RX Placeholder    diphenhydrAMINE  25 mg IntraVENous Q12H     PRN Meds: sodium chloride flush, sodium chloride, potassium chloride **OR** potassium alternative oral replacement **OR** potassium chloride, potassium chloride, magnesium sulfate, [DISCONTINUED] promethazine **OR** ondansetron, magnesium hydroxide, acetaminophen **OR** acetaminophen, albuterol sulfate HFA, diphenhydrAMINE, LORazepam, oxyCODONE HCl, promethazine, HYDROmorphone      Intake/Output Summary (Last 24 hours) at 1/13/2023 3054  Last data filed at 1/12/2023 1927  Gross per 24 hour   Intake 51.13 ml   Output --   Net 51.13 ml       Physical Exam Performed:    BP (!) 141/92   Pulse 70   Temp 97.8 °F (36.6 °C) (Oral)   Resp 16   Ht 5' 5\" (1.651 m)   Wt (!) 358 lb 11 oz (162.7 kg)   SpO2 91%   BMI 59.69 kg/m²     General appearance: No apparent distress, appears stated age and cooperative. HEENT: Pupils equal, round, and reactive to light. Conjunctivae/corneas clear. Neck: Supple, with full range of motion. No jugular venous distention. Trachea midline. Respiratory:  Normal respiratory effort. Clear to auscultation, bilaterally without Rales/Wheezes/Rhonchi. Cardiovascular: Regular rate and rhythm with normal S1/S2 without murmurs, rubs or gallops. Abdomen: Soft, non-tender, non-distended with normal bowel sounds. Musculoskeletal: No clubbing, cyanosis or edema bilaterally. Full range of motion without deformity. Skin: Skin color, texture, turgor normal.  No rashes or lesions. Neurologic:  Neurovascularly intact without any focal sensory/motor deficits.  Cranial nerves: II-XII intact, grossly non-focal.  Psychiatric: Alert and oriented, thought content appropriate, normal insight  Capillary Refill: Brisk, 3 seconds, normal   Peripheral Pulses: +2 palpable, equal bilaterally       Labs:   Recent Labs     01/12/23  1350 01/13/23  0351   WBC 10.6 8.9   HGB 14.7 14.0   HCT 44.0 42.9    262     Recent Labs     01/12/23  1350 01/13/23  0431     141 137   K 4.4  4.3 4.1     104 99   CO2 24  24 30   BUN 14  14 20   CREATININE 0.7  0.7 1.0   CALCIUM 9.1  9.1 8.7     Recent Labs     01/12/23  1350   AST 21   ALT 18   BILITOT 0.3   ALKPHOS 167* No results for input(s): INR in the last 72 hours.   Recent Labs     01/12/23  1350   TROPONINI <0.01       Urinalysis:      Lab Results   Component Value Date/Time    NITRU Negative 01/12/2023 04:37 PM    [address] Ash[address] 6-10 04/05/2015 08:05 PM    RBCUA >100 04/05/2015 08:05 PM    BLOODU Negative 01/12/2023 04:37 PM    SPECGRAV 1.014 01/12/2023 04:37 PM    GLUCOSEU Negative 01/12/2023 04:37 PM       Radiology:  VL Extremity Venous Bilateral   Final Result          PHARMACY TO DOSE VANCOMYCIN  PHARMACY TO DOSE VANCOMYCIN    Assessment/Plan:    Active Hospital Problems    Diagnosis     Cellulitis [L03.90]        Assessment    Bilateral leg chronic lymphedema  Morbid obesity-BMI 59  Uncontrolled hypertension  CAD  Hypertension  Hyperlipidemia  Diabetes mellitus     Plan    Status post bilateral lower extremity ultrasound performed in ED-negative for DVT  Cellulitis appears to be less likely, switch to p.o. cephalosporin  Check blood cultures  Resume home medications    DVT prophylaxix - home coumadin  Diet: No diet orders on file  Code Status: Prior     PT/OT Eval Status: ordered     Dispo - pending clinical improvement      Carissa Bush MD

## 2023-01-13 NOTE — CARE COORDINATION
INITIAL CASE MANAGEMENT ASSESSMENT    Reviewed chart, met with patient to assess possible discharge needs. Explained Case Management role/services. Living Situation: Patient live in a house with 2 steps to enter. ADLs: Independent     DME: Walker, Cane, W/C, BIPAP (not in use), Lif[employer] from [employer]    PT/OT Recs:    Discharge Recommendations:  Home with assist PRN, No therapy recommended at discharge   Xin Multani scored a 22/24 on the AM-PAC short mobility form. At this time, no further PT is recommended upon discharge due to no needs. Recommend patient returns to prior setting with prior services. PT Equipment Recommendations  Equipment Needed: No     Active Services: None     Transportation: Not currently driving/ will transport     Medications: [place] in [place]/No barriers    PCP: Laurie Guajardo MD      HD/PD: N/A    PLAN/COMMENTS: Plan to return to home. SW/CM provided contact information for patient or family to call with any questions. SW/CM will follow and assist as needed.    Electronically signed by Mayi Francois RN on 1/13/2023 at 2:08 PM

## 2023-01-13 NOTE — PROGRESS NOTES
Physician Progress Note      Idania Boone  CSN #:                  [id number]  :                       1970  ADMIT DATE:       2023 12:46 PM  [address] [place] DATE:  Selena Durhampiyush  PROVIDER #:        Vlad Newby MD          QUERY TEXT:    Pt admitted with BLE cellulitis. Pt noted to have DM. If possible, please   document in progress notes and discharge summary the relationship, if any,   between cellulitis and DM. The medical record reflects the following:  Risk Factors: HX- DM. Treated outpt with antibiotics without improvement. Clinical Indicators: Gluc 103, 112....(HGA1C 3/30/21 6.7),  Treatment: Clindamycin IV,  Zosyn IV, Vanco IV. Thank Anastacio Castillo RN BSN [email or web address]  Options provided:  -- BLE cellulitis associated with Diabetes  -- BLE cellulitis unrelated to Diabetes  -- Other - I will add my own diagnosis  -- Disagree - Not applicable / Not valid  -- Disagree - Clinically unable to determine / Unknown  -- Refer to Clinical Documentation Reviewer    PROVIDER RESPONSE TEXT:    BLE cellulitis associated with Diabetes. Query created by: Carmelita Leon on 2023 11:07 AM      QUERY TEXT:    Patient admitted with BMI 59. If possible, please document in progress notes   and discharge summary if you are evaluating and /or treating any of the   following: The medical record reflects the following:  Risk Factors: Female  Clinical Indicators: BMI 59,  5'5\",  358#  Treatment: Regular diet, monitor    ? Specificity of obesity and morbid obesity should be reported based on   physician documentation, as there are several published classifications and   definitions?  MS-DRG Training Guide. CDC:   https://cook-cool.info/. WHO:   http://anthony.biz/.  NIH:   LargeFood.be    Thank Ren Gupta Chintan TIMMONS BSN CDS CRCR  [email or web address]  Options provided:  -- Morbid obesity  -- Obesity  -- Severe obesity  -- Overweight  -- BMI not clinically significant  -- Other - I will add my own diagnosis  -- Disagree - Not applicable / Not valid  -- Disagree - Clinically unable to determine / Unknown  -- Refer to Clinical Documentation Reviewer    PROVIDER RESPONSE TEXT:    This patient has morbid obesity.     Query created by: Fan Olivares on 1/13/2023 11:09 AM      Electronically signed by:  Ashley Bradley MD 1/13/2023 11:44 AM

## 2023-01-13 NOTE — PROGRESS NOTES
Patient's oxygen saturation was 88% on room air. Patient complained of 4/10 chest pain stating that she could not fully take a deep breath. Ayanna Blanco aware and he ordered a Chest Xray, EKG, and respiratory eval and treat. Patient requesting pain medication at this time for the pain in her legs and chest. This RN provided education that the dilaudid could cause further respiratory depression and that it was not due at this time. Bed in lowest position and call light in reach.

## 2023-01-13 NOTE — PROGRESS NOTES
Physical Therapy  Facility/Department: [facility] MED SURG  Physical Therapy Initial and Discharge Assessment    Name: Marielena Hart  : 1970  MRN: [id number]  Date of Service: 2023    Discharge Recommendations:  Home with assist PRN, No therapy recommended at discharge   Marielena Hart scored a 22/24 on the AM-PAC short mobility form. At this time, no further PT is recommended upon discharge due to no needs. Recommend patient returns to prior setting with prior services. PT Equipment Recommendations  Equipment Needed: No      Patient Diagnosis(es): The primary encounter diagnosis was Bilateral lower leg cellulitis. Diagnoses of Elevated blood pressure reading, Bilateral lower extremity edema, and Failure of outpatient treatment were also pertinent to this visit. Past Medical History:  has a past medical history of CAD (coronary artery disease), Carotid stenosis, Cellulitis, CHF (congestive heart failure) ([address] Diabetes ([address] History of tobacco use, Hyperlipidemia, Hypertension, Morbid obesity ([address] LAW (obstructive sleep apnea), and Sleep apnea. Past Surgical History:  has a past surgical history that includes Lap Band; Cholecystectomy; hernia repair; Abdomen surgery; Tonsillectomy; Endoscopy, colon, diagnostic; Cardiac catheterization (2012); Gastric Band; and Colon surgery. Assessment   Assessment: The pt is a 49 yo female who presented to the ED with B LE swelling and reddness. The pt has failed at OP abx. Doppler neagtive for DVT. The pt lives with her  in a house. PTA, the pt was indep in mobility w/o a device and indep in self-care; her and her  share in the IADL's. PMHx: CAD, carotid stenosis, cellulitis, CHF, DM, HTN, morbid obesity, LAW, sleep apnea, gastric sleeve    Today, the pt demonstrated that she is functioning just slightly below her baseline and is limited by pain in her B LE's.  Despite her pain, the pt is up ad abel in the room and demonstrated that she was safe to be ambulating w/o a device for short distances in the room. Anticipate that the pt will be safe for home with prn assist of her . Do not feel the pt has further skilled PT needs at this time and will be d/c from acute care PT services. Therapy Prognosis: Good  Decision Making: Low Complexity  Barriers to Learning: none  No Skilled PT: No PT goals identified  Requires PT Follow-Up: No  Activity Tolerance  Activity Tolerance: Patient tolerated evaluation without incident     Plan   Physcial Therapy Plan  Additional Comments: d/c from acute care PT services  Safety Devices  Type of Devices: Call light within reach, Left in bed (the pt is up ad janel)     Restrictions  Restrictions/Precautions  Restrictions/Precautions: Up Ad Janel     Subjective   General  Chart Reviewed: Yes  Additional Pertinent Hx: Per Marshall Harris MD H&P on 1-: The pt is a 51 yo female who presented to the ED with B LE swelling and reddness. The pt has failed at OP abx. Doppler neagtive for DVT.       PMHx: CAD, carotid stenosis, cellulitis, CHF, DM, HTN, morbid obesity, LAW, sleep apnea, gastric sleeve  Response To Previous Treatment: Not applicable  Family / Caregiver Present: No  Referring Practitioner: Marshall Harris MD  Referral Date : 01/12/23  Diagnosis: Bilateral leg cellulitis  Follows Commands: Within Functional Limits  Subjective  Subjective: the pt was found to be in the bed and awake; the pt reporting she doesn't hink she needs therapy but was agreeable to therapy assessment; the pt reporting 6/10 pain in her B legs that increases with ambulation         Social/Functional History  Social/Functional History  Lives With: Spouse  Type of Home: House  Home Layout: One level (ranch with basement, laundry upstairs)  Home Access: Stairs to enter without rails  Entrance Stairs - Number of Steps: 2  Bathroom Shower/Tub: Walk-in shower  Bathroom Toilet: Handicap height  Bathroom Equipment: Grab bars in shower, Built-in shower seat, Grab bars around toilet, 3-in-1 commode  Bathroom Accessibility: Accessible  Home Equipment: Walker, rolling, Wheelchair-manual, Rollator, Reacher, Long-handled shoehorn, Sock aid (toileting aid)  Has the patient had two or more falls in the past year or any fall with injury in the past year?: No  ADL Assistance: Independent  Homemaking Assistance: Independent  Ambulation Assistance: Independent  Transfer Assistance: Independent  Active : No  Patient's  Info: pt able to drive, but  drives mostly d/t pt hx of syncopal episodes  Occupation: On disability  IADL Comments: pt sleeps in regular bed  Vision/Hearing  Vision  Vision: Impaired  Vision Exceptions: Wears glasses for reading  Hearing  Hearing: Within functional limits    Cognition   Orientation  Overall Orientation Status: Within Functional Limits  Cognition  Overall Cognitive Status: WFL     Objective           Gross Assessment  AROM: Generally decreased, functional  Strength: Generally decreased, functional  Coordination: Within functional limits  Tone: Normal  Sensation: Intact     Bed mobility  Supine to Sit: Modified independent  Sit to Supine: Modified independent  Bed Mobility Comments: HOB elevated  Transfers  Sit to Stand: Independent  Stand to Sit: Independent  Ambulation  Surface: Level tile  Device: No Device  Assistance: Modified Independent  Quality of Gait: antalgic gait pattern due to pain in B LE's; increased time and effort and distance limited due to pain  Distance: 20 feet in the room setting  Comments: the pt is up ad abel  More Ambulation?: No  Stairs/Curb  Stairs?: No     Balance  Sitting - Static: Good  Sitting - Dynamic: Good  Standing - Static: Good;-  Standing - Dynamic: Good;-  Comments: no LOB and the pt reporting not feeling unsteady but has deviations due to LE pain           AM-PAC Score  AM-PAC Inpatient Mobility Raw Score : 22 (01/13/23 1143)  AM-PAC Inpatient T-Scale Score : 53.28 (01/13/23 1143)  Mobility Inpatient CMS 0-100% Score: 20.91 (01/13/23 1143)  Mobility Inpatient CMS G-Code Modifier : CJ (01/13/23 1143)          Goals  Short Term Goals  Time Frame for Short Term Goals: no acute care PT goals identified; the pt is up ad abel in the room  Patient Goals   Patient Goals : to go home       Education  Patient Education  Education Given To: Patient  Education Provided: Role of Therapy;Plan of Care  Education Method: Verbal  Barriers to Learning: None  Education Outcome: Verbalized understanding;Demonstrated understanding      Therapy Time   Individual Concurrent Group Co-treatment   Time In 1110         Time Out 1140         Minutes 30         Timed Code Treatment Minutes: 15 Minutes  Electronically signed by Frank Muñoz, PT [id number] on 1/13/2023 at 11:44 AM

## 2023-01-13 NOTE — CONSULTS
Clinical Pharmacy Note  Vancomycin Consult    Lisset Delaney is a 50 y.o. female ordered Vancomycin for cellulitis; consult received from Dr. Melly Gonsalez to manage therapy. Also receiving Zosyn. Allergies:  Lortab [hydrocodone-acetaminophen], Vancomycin, Hydrocodone, Lisinopril, Morphine, Adhesive tape, and Sulfa antibiotics     Temp max:  Temp (24hrs), Av.7 °F (36.5 °C), Min:97.3 °F (36.3 °C), Max:98.1 °F (36.7 °C)      Recent Labs     23  1350   WBC 10.6       Recent Labs     23  1350   BUN 14  14   CREATININE 0.7  0.7         Intake/Output Summary (Last 24 hours) at 2023 2154  Last data filed at 2023 1927  Gross per 24 hour   Intake 51.13 ml   Output --   Net 51.13 ml       Culture Results:  Pending    Ht Readings from Last 1 Encounters:   23 5' 5\" (1.651 m)        Wt Readings from Last 1 Encounters:   23 (!) 360 lb 0.2 oz (163.3 kg)         Estimated Creatinine Clearance: 151 mL/min (based on SCr of 0.7 mg/dL). Assessment/Plan:  Day # 1 of vancomycin. Vancomycin 1250 mg IV every 12 hours with diphenhydramine 25 mg prior to dose ordered. Goal trough 10-15  Predicted trough: 11.4 mg/L      Thank you for the consult.      Tierney Swain, [facility]  2023 9:55 PM

## 2023-01-14 VITALS
TEMPERATURE: 98.1 F | WEIGHT: 293 LBS | HEIGHT: 65 IN | RESPIRATION RATE: 16 BRPM | HEART RATE: 80 BPM | BODY MASS INDEX: 48.82 KG/M2 | DIASTOLIC BLOOD PRESSURE: 81 MMHG | OXYGEN SATURATION: 91 % | SYSTOLIC BLOOD PRESSURE: 149 MMHG

## 2023-01-14 PROBLEM — I89.0 LYMPHEDEMA DUE TO CHRONIC INFLAMMATION: Status: ACTIVE | Noted: 2023-01-14

## 2023-01-14 PROBLEM — E66.01 MORBID OBESITY DUE TO EXCESS CALORIES (HCC): Status: ACTIVE | Noted: 2023-01-14

## 2023-01-14 LAB
ANION GAP SERPL CALCULATED.3IONS-SCNC: 11 MMOL/L (ref 3–16)
BASOPHILS ABSOLUTE: 0.1 K/UL (ref 0–0.2)
BASOPHILS RELATIVE PERCENT: 0.5 %
BUN BLDV-MCNC: 19 MG/DL (ref 7–20)
CALCIUM SERPL-MCNC: 8.3 MG/DL (ref 8.3–10.6)
CHLORIDE BLD-SCNC: 102 MMOL/L (ref 99–110)
CO2: 27 MMOL/L (ref 21–32)
CREAT SERPL-MCNC: 0.8 MG/DL (ref 0.6–1.1)
EOSINOPHILS ABSOLUTE: 0.1 K/UL (ref 0–0.6)
EOSINOPHILS RELATIVE PERCENT: 1.2 %
GFR SERPL CREATININE-BSD FRML MDRD: >60 ML/MIN/{1.73_M2}
GLUCOSE BLD-MCNC: 156 MG/DL (ref 70–99)
HCT VFR BLD CALC: 42.2 % (ref 36–48)
HEMOGLOBIN: 13.5 G/DL (ref 12–16)
LYMPHOCYTES ABSOLUTE: 1 K/UL (ref 1–5.1)
LYMPHOCYTES RELATIVE PERCENT: 8 %
MCH RBC QN AUTO: 29.9 PG (ref 26–34)
MCHC RBC AUTO-ENTMCNC: 31.9 G/DL (ref 31–36)
MCV RBC AUTO: 93.6 FL (ref 80–100)
MONOCYTES ABSOLUTE: 0.6 K/UL (ref 0–1.3)
MONOCYTES RELATIVE PERCENT: 5.2 %
NEUTROPHILS ABSOLUTE: 10.4 K/UL (ref 1.7–7.7)
NEUTROPHILS RELATIVE PERCENT: 85.1 %
PDW BLD-RTO: 14.4 % (ref 12.4–15.4)
PLATELET # BLD: 247 K/UL (ref 135–450)
PMV BLD AUTO: 8.4 FL (ref 5–10.5)
POTASSIUM REFLEX MAGNESIUM: 4 MMOL/L (ref 3.5–5.1)
RBC # BLD: 4.51 M/UL (ref 4–5.2)
SODIUM BLD-SCNC: 140 MMOL/L (ref 136–145)
WBC # BLD: 12.3 K/UL (ref 4–11)

## 2023-01-14 PROCEDURE — 85025 COMPLETE CBC W/AUTO DIFF WBC: CPT

## 2023-01-14 PROCEDURE — 6360000002 HC RX W HCPCS: Performed by: INTERNAL MEDICINE

## 2023-01-14 PROCEDURE — 94660 CPAP INITIATION&MGMT: CPT

## 2023-01-14 PROCEDURE — 6370000000 HC RX 637 (ALT 250 FOR IP): Performed by: INTERNAL MEDICINE

## 2023-01-14 PROCEDURE — 80048 BASIC METABOLIC PNL TOTAL CA: CPT

## 2023-01-14 PROCEDURE — 36415 COLL VENOUS BLD VENIPUNCTURE: CPT

## 2023-01-14 PROCEDURE — 2580000003 HC RX 258: Performed by: INTERNAL MEDICINE

## 2023-01-14 RX ORDER — CEPHALEXIN 500 MG/1
500 CAPSULE ORAL EVERY 12 HOURS SCHEDULED
Qty: 7 CAPSULE | Refills: 0 | Status: SHIPPED | OUTPATIENT
Start: 2023-01-14 | End: 2023-01-18

## 2023-01-14 RX ADMIN — METOPROLOL TARTRATE 50 MG: 50 TABLET ORAL at 08:35

## 2023-01-14 RX ADMIN — LEVOTHYROXINE SODIUM 75 MCG: 0.07 TABLET ORAL at 05:05

## 2023-01-14 RX ADMIN — DIPHENHYDRAMINE HCL 25 MG: 25 TABLET ORAL at 08:44

## 2023-01-14 RX ADMIN — PRAMIPEXOLE DIHYDROCHLORIDE 1.5 MG: 0.5 TABLET ORAL at 08:36

## 2023-01-14 RX ADMIN — CETIRIZINE HYDROCHLORIDE 10 MG: 10 TABLET, FILM COATED ORAL at 08:36

## 2023-01-14 RX ADMIN — CYCLOBENZAPRINE 10 MG: 10 TABLET, FILM COATED ORAL at 08:37

## 2023-01-14 RX ADMIN — ASPIRIN 81 MG: 81 TABLET, COATED ORAL at 08:36

## 2023-01-14 RX ADMIN — TORSEMIDE 20 MG: 20 TABLET ORAL at 08:37

## 2023-01-14 RX ADMIN — HYDROXYZINE HYDROCHLORIDE 25 MG: 25 TABLET, FILM COATED ORAL at 08:36

## 2023-01-14 RX ADMIN — HYDROMORPHONE HYDROCHLORIDE 0.5 MG: 1 INJECTION, SOLUTION INTRAMUSCULAR; INTRAVENOUS; SUBCUTANEOUS at 08:44

## 2023-01-14 RX ADMIN — HYDROMORPHONE HYDROCHLORIDE 0.5 MG: 1 INJECTION, SOLUTION INTRAMUSCULAR; INTRAVENOUS; SUBCUTANEOUS at 05:05

## 2023-01-14 RX ADMIN — FAMOTIDINE 20 MG: 20 TABLET ORAL at 08:37

## 2023-01-14 RX ADMIN — CEPHALEXIN 500 MG: 500 CAPSULE ORAL at 08:36

## 2023-01-14 RX ADMIN — SODIUM CHLORIDE, PRESERVATIVE FREE 10 ML: 5 INJECTION INTRAVENOUS at 08:50

## 2023-01-14 RX ADMIN — GABAPENTIN 800 MG: 400 CAPSULE ORAL at 08:37

## 2023-01-14 RX ADMIN — AMLODIPINE BESYLATE 5 MG: 5 TABLET ORAL at 08:36

## 2023-01-14 ASSESSMENT — PAIN SCALES - GENERAL
PAINLEVEL_OUTOF10: 7
PAINLEVEL_OUTOF10: 7

## 2023-01-14 ASSESSMENT — PAIN DESCRIPTION - LOCATION
LOCATION: LEG
LOCATION: LEG

## 2023-01-14 ASSESSMENT — PAIN - FUNCTIONAL ASSESSMENT: PAIN_FUNCTIONAL_ASSESSMENT: PREVENTS OR INTERFERES SOME ACTIVE ACTIVITIES AND ADLS

## 2023-01-14 ASSESSMENT — PAIN DESCRIPTION - FREQUENCY: FREQUENCY: INTERMITTENT

## 2023-01-14 ASSESSMENT — PAIN DESCRIPTION - ORIENTATION
ORIENTATION: RIGHT;LEFT
ORIENTATION: RIGHT;LEFT

## 2023-01-14 ASSESSMENT — PAIN DESCRIPTION - DESCRIPTORS: DESCRIPTORS: PRESSURE;SHARP

## 2023-01-14 NOTE — PROGRESS NOTES
Patient discharged as per order. Discharge instructions ,and follow-ups reviewed. Patient informed the importance of medication compliance. Patient verbalized understanding. Patient IV removed, catheter intact, site unremarkable. Patient taken to  car in wheelchair by staff. Patient left with all her belongings. All questions were answered.

## 2023-01-14 NOTE — DISCHARGE SUMMARY
Hospital Medicine Discharge Summary    Patient ID: Nigel Burn      Patient's PCP: Meera Dykes MD    Admit Date: 1/12/2023     Discharge Date:   01/14/23      Admitting Provider: Fay Olivera MD     Discharge Provider: Pedro Molina MD     Discharge Diagnoses: Active Hospital Problems    Diagnosis     Lymphedema due to chronic inflammation [I89.0]      Priority: Medium    Morbid obesity due to excess calories ([address] [E66.01]      Priority: Medium    Cellulitis [L03.90]        The patient was seen and examined on day of discharge and this discharge summary is in conjunction with any daily progress note from day of discharge. Hospital Course:     Status post bilateral lower extremity ultrasound performed in ED-negative for DVT  Cellulitis appears to be less likely, switch to p.o. cephalosporin  Most likely chronic lymphedema in the setting of morbid obesity  Check blood cultures  Resume home medications          Physical Exam Performed:     BP (!) 149/81   Pulse 80   Temp 98.1 °F (36.7 °C) (Oral)   Resp 16   Ht 5' 5\" (1.651 m)   Wt (!) 359 lb 12.7 oz (163.2 kg)   SpO2 91%   BMI 59.87 kg/m²       General appearance:  No apparent distress, appears stated age and cooperative. HEENT:  Normal cephalic, atraumatic without obvious deformity. Pupils equal, round, and reactive to light. Extra ocular muscles intact. Conjunctivae/corneas clear. Neck: Supple, with full range of motion. No jugular venous distention. Trachea midline. Respiratory:  Normal respiratory effort. Clear to auscultation, bilaterally without Rales/Wheezes/Rhonchi. Cardiovascular:  Regular rate and rhythm with normal S1/S2 without murmurs, rubs or gallops. Abdomen: Soft, non-tender, non-distended with normal bowel sounds. Musculoskeletal:  No clubbing, cyanosis or edema bilaterally. Full range of motion without deformity. Skin: Skin color, texture, turgor normal.  No rashes or lesions.   Neurologic:  Neurovascularly intact without any focal sensory/motor deficits. Cranial nerves: II-XII intact, grossly non-focal.  Psychiatric:  Alert and oriented, thought content appropriate, normal insight  Capillary Refill: Brisk,< 3 seconds   Peripheral Pulses: +2 palpable, equal bilaterally       Labs: For convenience and continuity at follow-up the following most recent labs are provided:      CBC:    Lab Results   Component Value Date/Time    WBC 12.3 01/14/2023 06:19 AM    HGB 13.5 01/14/2023 06:19 AM    HCT 42.2 01/14/2023 06:19 AM     01/14/2023 06:19 AM       Renal:    Lab Results   Component Value Date/Time     01/14/2023 06:19 AM    K 4.0 01/14/2023 06:19 AM     01/14/2023 06:19 AM    CO2 27 01/14/2023 06:19 AM    BUN 19 01/14/2023 06:19 AM    CREATININE 0.8 01/14/2023 06:19 AM    CALCIUM 8.3 01/14/2023 06:19 AM    PHOS 3.7 04/07/2015 06:22 AM         Significant Diagnostic Studies    Radiology:   XR CHEST (2 VW)   Final Result   No acute process. VL Extremity Venous Bilateral   Final Result             Consults:     PHARMACY TO DOSE VANCOMYCIN    Disposition:  home     Condition at Discharge: Stable    Discharge Instructions/Follow-up:  Follow-up with PCP within 7 days from discharge, not doing so could have life-threatening consequences. Take medication as instructed;  return to the emergency department if persistent symptoms, experiencing side effects from medication including nausea vomiting or unable to keep medications down.        Code Status:  Full Code     Activity: activity as tolerated    Diet: cardiac diet      Discharge Medications:     Current Discharge Medication List             Details   cephALEXin (KEFLEX) 500 MG capsule Take 1 capsule by mouth every 12 hours for 7 doses  Qty: 7 capsule, Refills: 0                Details   potassium chloride (KLOR-CON M) 10 MEQ extended release tablet Take 10 mEq by mouth 2 times daily      traZODone (DESYREL) 100 MG tablet Take 200 mg by mouth nightly torsemide (DEMADEX) 20 MG tablet Take 20 mg by mouth daily      venlafaxine (EFFEXOR) 75 MG tablet Take 75 mg by mouth 2 times daily      hydrOXYzine HCl (ATARAX) 25 MG tablet Take 25 mg by mouth in the morning and at bedtime      lidocaine (LIDODERM) 5 % Place 1 patch onto the skin daily 12 hours on, 12 hours off. Apply to back      metoprolol tartrate (LOPRESSOR) 25 MG tablet Take 50 mg by mouth 2 times daily      nystatin (MYCOSTATIN) 230513 UNIT/GM powder Apply topically as needed (to skin folds) Apply topically 4 times daily. albuterol sulfate HFA (VENTOLIN HFA) 108 (90 Base) MCG/ACT inhaler Inhale 2 puffs into the lungs every 6 hours as needed for Wheezing      amitriptyline (ELAVIL) 25 MG tablet Take 25 mg by mouth nightly      cyclobenzaprine (FLEXERIL) 10 MG tablet Take 10 mg by mouth in the morning and at bedtime      diclofenac sodium (VOLTAREN) 1 % GEL Apply topically as needed for Pain      famotidine (PEPCID) 20 MG tablet Take 20 mg by mouth 2 times daily      gi cocktail Take 30 mLs by mouth daily as needed      LORazepam (ATIVAN) 1 MG tablet Take 1 mg by mouth daily as needed for Anxiety.       diphenhydrAMINE (BENADRYL) 25 MG tablet Take 25 mg by mouth daily as needed for Itching      ondansetron (ZOFRAN-ODT) 4 MG disintegrating tablet Take 1 tablet by mouth 3 times daily as needed for Nausea or Vomiting  Qty: 30 tablet, Refills: 0      pramipexole (MIRAPEX) 1.5 MG tablet Take 1.5 mg by mouth 2 times daily      oxyCODONE HCl (OXY-IR) 10 MG immediate release tablet Take 10 mg by mouth every 6 hours as needed for Pain.      levothyroxine (SYNTHROID) 75 MCG tablet Take 75 mcg by mouth Daily      cetirizine (ZYRTEC) 10 MG tablet Take 10 mg by mouth daily      vitamin B-12 (CYANOCOBALAMIN) 1000 MCG tablet Take 1,000 mcg by mouth daily      amLODIPine (NORVASC) 5 MG tablet Take 5 mg by mouth daily      Multiple Vitamins-Minerals (THERAPEUTIC MULTIVITAMIN-MINERALS) tablet Take 1 tablet by mouth daily      atorvastatin (LIPITOR) 80 MG tablet Take 1 tablet by mouth daily  Qty: 30 tablet, Refills: 5      gabapentin (NEURONTIN) 800 MG tablet Take 800 mg by mouth three times daily. promethazine (PHENERGAN) 25 MG tablet Take 25 mg by mouth every 4 hours as needed for Nausea. aspirin 81 MG EC tablet Take 1 tablet by mouth daily. Time Spent on discharge: 33 min  in the examination, evaluation, counseling and review of medications and discharge plan. Signed:    Ever Hurtado MD   1/14/2023      Thank you Charles Tate MD for the opportunity to be involved in this patient's care. If you have any questions or concerns, please feel free to contact me at [phone].

## 2023-01-14 NOTE — PROGRESS NOTES
Patient with blurred vision and emesis this evening. VS obtained and [facility] notified. Patient given prn antiemetic and toradol per orders. Patient informed this nurse that she has complicated migraines with SANCHEZ syndrome and has to get infusions every month. She is due to follow up with a nuerologist at [facility] later this month. [facility] informed of this history as well. Bed in lowest position and call light in reach.

## 2023-01-14 NOTE — DISCHARGE INSTR - COC
Continuity of Care Form    Patient Name: Robby Prince   :  1970  MRN:  [id number]    Admit date:  2023  Discharge date:  ***    Code Status Order: Full Code   Advance Directives:     Admitting Physician:  Oleg Painting MD  PCP: Chano Crar MD    Discharging Nurse: [facility] Unit/Room#: I0G-1952/7389-03  Discharging Unit Phone Number: ***    Emergency Contact:   Extended Emergency Contact Information  Primary Emergency Contact: [address] , [facility]  Home Phone: [phone]  Work Phone: [phone]  Mobile Phone: [phone]  Relation: Parent   needed? No  Secondary Emergency Contact: Sharon Kendrick  Address: [address] Phone: [phone]  Work Phone: [phone]  Mobile Phone: [phone]  Relation: Spouse   needed?  No    Past Surgical History:  Past Surgical History:   Procedure Laterality Date    ABDOMINAL SURGERY      CARDIAC CATHETERIZATION  2012    100% RCA with collaterals    CHOLECYSTECTOMY      COLON SURGERY      ENDOSCOPY, COLON, DIAGNOSTIC      GASTRIC BAND      gastric sleeve    HERNIA REPAIR      LAPAROSCOPIC GASTRIC BANDING      TONSILLECTOMY         Immunization History:   Immunization History   Administered Date(s) Administered    COVID-19, MODERNA BLUE border, Primary or Immunocompromised, (age 12y+), IM, 100 mcg/0.5mL 2021, 04/15/2021    Influenza Virus Vaccine 10/22/2015    Pneumococcal Polysaccharide (Vqavuahoj14) 2013       Active Problems:  Patient Active Problem List   Diagnosis Code    Chest pain R07.9    CAD (coronary artery disease) I25.10    Elevated left ventricular end-diastolic pressure (LVEDP) R94.30    HTN (hypertension) I10    HLD (hyperlipidemia) E78.5    Morbid obesity (HCC) E66.01    Partial small bowel obstruction ([facility] K56.600    Morbid obesity with BMI of 70 and over, adult ([facility] E66.01, Z68.45    Cellulitis L03.90    Abdominal pain R10.9    Abnormal LFTs R79.89    Cellulitis of abdominal wall L03.311    Panniculitis M79.3       Isolation/Infection:   Isolation            No Isolation           Unreconciled Outside Infections       Enable clinical decision support by reconciling outside information with the patient's chart. .      Infection Onset Last Indicated Last Received Source    No mapped external infections found      . 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 11/27/21 12/01/21 12/01/21 [facility]                  Patient Infection Status       None to display            Nurse Assessment:  Last Vital Signs: BP (!) 149/81   Pulse 80   Temp 98.1 °F (36.7 °C) (Oral)   Resp 16   Ht 5' 5\" (1.651 m)   Wt (!) 359 lb 12.7 oz (163.2 kg)   SpO2 91%   BMI 59.87 kg/m²     Last documented pain score (0-10 scale): Pain Level: 7  Last Weight:   Wt Readings from Last 1 Encounters:   01/14/23 (!) 359 lb 12.7 oz (163.2 kg)     Mental Status:  {IP PT MENTAL STATUS:20030}    IV Access:  { OG IV ACCESS:097329491}    Nursing Mobility/ADLs:  Walking   {[facility] DME SCGL:572773374}  Transfer  {[facility] DME TCRH:646320052}  Bathing  {[facility] DME FFDQ:154395100}  Dressing  {P DME WLYR:391814196}  Toileting  {[facility] DME JNGR:817568405}  Feeding  {[facility] DME KADEN:886331235}  Med Admin  {[facility] DME AMPJ:064862413}  Med Delivery   {[facility] MED Delivery:123570884}    Wound Care Documentation and Therapy:        Elimination:  Continence: Bowel: {YES / XE:27722}  Bladder: {YES / JX:76411}  Urinary Catheter: {Urinary Catheter:954745817}   Colostomy/Ileostomy/Ileal Conduit: {YES / XD:53134}       Date of Last BM: ***    Intake/Output Summary (Last 24 hours) at 1/14/2023 0946  Last data filed at 1/14/2023 0850  Gross per 24 hour   Intake 10 ml   Output --   Net 10 ml     I/O last 3 completed shifts:   In: 51.1 [IV Piggyback:51.1]  Out: -     Safety Concerns:     [address] OG Safety Concerns:975669122}    Impairments/Disabilities: [facility] Impairments/Disabilities:977880583}    Nutrition Therapy:  Current Nutrition Therapy:   [facility] Diet List:257608714}    Routes of Feeding: {CHP DME Other Feedings:171997598}  Liquids: {Slp liquid thickness:83801}  Daily Fluid Restriction: {CHP DME Yes amt example:436423987}  Last Modified Barium Swallow with Video (Video Swallowing Test): {Done Not Done PGAY:781193800}    Treatments at the Time of Hospital Discharge:   Respiratory Treatments: ***  Oxygen Therapy:  {Therapy; copd oxygen:96835}  Ventilator:    {[facility] Vent DXYX:912848835}    Rehab Therapies: {THERAPEUTIC INTERVENTION:6865205810}  Weight Bearing Status/Restrictions: [facility] Weight Bearin}  Other Medical Equipment (for information only, NOT a DME order):  {EQUIPMENT:871070331}  Other Treatments: ***    Patient's personal belongings (please select all that are sent with patient):  {[facility] DME Belongings:190290712}    RN SIGNATURE:  {Esignature:427035111}    CASE MANAGEMENT/SOCIAL WORK SECTION    Inpatient Status Date: ***    Readmission Risk Assessment Score:  Readmission Risk              Risk of Unplanned Readmission:  14           Discharging to Facility/ Agency   Name:   Address:  Phone:  Fax:    Dialysis Facility (if applicable)   Name:  Address:  Dialysis Schedule:  Phone:  Fax:    / signature: {Esignature:173416725}    PHYSICIAN SECTION    Prognosis: Fair    Condition at Discharge: Stable    Rehab Potential (if transferring to Rehab): Fair    Recommended Labs or Other Treatments After Discharge: Follow-up with PCP within 7 days from discharge, not doing so could have life-threatening consequences. Take medication as instructed;  return to the emergency department if persistent symptoms, experiencing side effects from medication including nausea vomiting or unable to keep medications down.        Physician Certification: I certify the above information and transfer of Sangeeta Keith  is necessary for the continuing treatment of the diagnosis listed and that she requires no additional services     Update Admission H&P: No change in H&P    PHYSICIAN SIGNATURE:  Electronically signed by Magdalena Olivo MD on 1/14/23 at 9:47 AM EST

## 2023-01-14 NOTE — CARE COORDINATION
Discharge order noted. Chart reviewed. Plan is to return home with spouse. Previously denied needs. No additional discharge needs identified at this time.      Electronically signed by Andreina Manley RN MSN on 1/14/2023 at 10:12 AM

## 2023-01-14 NOTE — PLAN OF CARE
Problem: Discharge Planning  Goal: Discharge to home or other facility with appropriate resources  Outcome: Adequate for Discharge  Flowsheets (Taken 1/14/2023 0835)  Discharge to home or other facility with appropriate resources:   Identify barriers to discharge with patient and caregiver   Arrange for needed discharge resources and transportation as appropriate   Identify discharge learning needs (meds, wound care, etc)     Problem: Pain  Goal: Verbalizes/displays adequate comfort level or baseline comfort level  Outcome: Adequate for Discharge     Problem: Safety - Adult  Goal: Free from fall injury  Outcome: Adequate for Discharge  Flowsheets (Taken 1/14/2023 0835)  Free From Fall Injury: Instruct family/caregiver on patient safety

## 2023-01-16 LAB
BLOOD CULTURE, ROUTINE: NORMAL
CULTURE, BLOOD 2: NORMAL

## 2025-02-02 ENCOUNTER — APPOINTMENT (OUTPATIENT)
Dept: GENERAL RADIOLOGY | Age: 55
DRG: 394 | End: 2025-02-02
Payer: MEDICARE

## 2025-02-02 ENCOUNTER — HOSPITAL ENCOUNTER (INPATIENT)
Age: 55
LOS: 2 days | Discharge: HOME | DRG: 394 | End: 2025-02-04
Attending: STUDENT IN AN ORGANIZED HEALTH CARE EDUCATION/TRAINING PROGRAM | Admitting: HOSPITALIST
Payer: MEDICARE

## 2025-02-02 DIAGNOSIS — R06.02 SHORTNESS OF BREATH: ICD-10-CM

## 2025-02-02 DIAGNOSIS — I25.10 CORONARY ARTERY DISEASE INVOLVING NATIVE CORONARY ARTERY OF NATIVE HEART, UNSPECIFIED WHETHER ANGINA PRESENT: ICD-10-CM

## 2025-02-02 DIAGNOSIS — R07.9 CHEST PAIN, UNSPECIFIED TYPE: Primary | ICD-10-CM

## 2025-02-02 PROBLEM — J18.9 PNEUMONIA, UNSPECIFIED ORGANISM: Status: ACTIVE | Noted: 2025-02-02

## 2025-02-02 LAB
ALBUMIN SERPL-MCNC: 3.7 G/DL (ref 3.4–5)
ALBUMIN/GLOB SERPL: 1.5 {RATIO} (ref 1.1–2.2)
ALP SERPL-CCNC: 139 U/L (ref 40–129)
ALT SERPL-CCNC: 35 U/L (ref 10–40)
ANION GAP SERPL CALCULATED.3IONS-SCNC: 9 MMOL/L (ref 3–16)
AST SERPL-CCNC: 36 U/L (ref 15–37)
BASE EXCESS BLDV CALC-SCNC: 8.6 MMOL/L
BASOPHILS # BLD: 0.1 K/UL (ref 0–0.2)
BASOPHILS NFR BLD: 0.9 %
BILIRUB SERPL-MCNC: <0.2 MG/DL (ref 0–1)
BUN SERPL-MCNC: 17 MG/DL (ref 7–20)
CALCIUM SERPL-MCNC: 8.6 MG/DL (ref 8.3–10.6)
CHLORIDE SERPL-SCNC: 104 MMOL/L (ref 99–110)
CHOLEST SERPL-MCNC: 138 MG/DL (ref 0–199)
CO2 BLDV-SCNC: 37 MMOL/L
CO2 SERPL-SCNC: 31 MMOL/L (ref 21–32)
COHGB MFR BLDV: 0.9 %
CREAT SERPL-MCNC: 0.7 MG/DL (ref 0.6–1.1)
DEPRECATED RDW RBC AUTO: 17 % (ref 12.4–15.4)
EOSINOPHIL # BLD: 0.1 K/UL (ref 0–0.6)
EOSINOPHIL NFR BLD: 0.7 %
FLUAV + FLUBV AG NOSE IA.RAPID: NOT DETECTED
FLUAV + FLUBV AG NOSE IA.RAPID: NOT DETECTED
GFR SERPLBLD CREATININE-BSD FMLA CKD-EPI: >90 ML/MIN/{1.73_M2}
GLUCOSE SERPL-MCNC: 163 MG/DL (ref 70–99)
HCG SERPL QL: NEGATIVE
HCO3 BLDV-SCNC: 35 MMOL/L (ref 23–29)
HCT VFR BLD AUTO: 34.8 % (ref 36–48)
HDLC SERPL-MCNC: 52 MG/DL (ref 40–60)
HGB BLD-MCNC: 10.8 G/DL (ref 12–16)
LDLC SERPL CALC-MCNC: 62 MG/DL
LIPASE SERPL-CCNC: 14 U/L (ref 13–60)
LYMPHOCYTES # BLD: 1.7 K/UL (ref 1–5.1)
LYMPHOCYTES NFR BLD: 12.6 %
MAGNESIUM SERPL-MCNC: 2.05 MG/DL (ref 1.8–2.4)
MCH RBC QN AUTO: 24.8 PG (ref 26–34)
MCHC RBC AUTO-ENTMCNC: 31.1 G/DL (ref 31–36)
MCV RBC AUTO: 79.9 FL (ref 80–100)
METHGB MFR BLDV: 1.3 %
MONOCYTES # BLD: 1 K/UL (ref 0–1.3)
MONOCYTES NFR BLD: 7.8 %
NEUTROPHILS # BLD: 10.2 K/UL (ref 1.7–7.7)
NEUTROPHILS NFR BLD: 78 %
NT-PROBNP SERPL-MCNC: 145 PG/ML (ref 0–124)
O2 THERAPY: ABNORMAL
PCO2 BLDV: 57 MMHG (ref 40–50)
PH BLDV: 7.4 [PH] (ref 7.35–7.45)
PLATELET # BLD AUTO: 261 K/UL (ref 135–450)
PMV BLD AUTO: 8.3 FL (ref 5–10.5)
PO2 BLDV: 44 MMHG
POTASSIUM SERPL-SCNC: 4 MMOL/L (ref 3.5–5.1)
PROCALCITONIN SERPL IA-MCNC: 0.04 NG/ML (ref 0–0.15)
PROT SERPL-MCNC: 6.1 G/DL (ref 6.4–8.2)
RBC # BLD AUTO: 4.35 M/UL (ref 4–5.2)
SAO2 % BLDV: 78 %
SARS-COV-2 RDRP RESP QL NAA+PROBE: NOT DETECTED
SODIUM SERPL-SCNC: 144 MMOL/L (ref 136–145)
TRIGL SERPL-MCNC: 119 MG/DL (ref 0–150)
TROPONIN, HIGH SENSITIVITY: 8 NG/L (ref 0–14)
VLDLC SERPL CALC-MCNC: 24 MG/DL
WBC # BLD AUTO: 13.1 K/UL (ref 4–11)

## 2025-02-02 PROCEDURE — 36415 COLL VENOUS BLD VENIPUNCTURE: CPT

## 2025-02-02 PROCEDURE — 71045 X-RAY EXAM CHEST 1 VIEW: CPT

## 2025-02-02 PROCEDURE — 1200000000 HC SEMI PRIVATE

## 2025-02-02 PROCEDURE — 87635 SARS-COV-2 COVID-19 AMP PRB: CPT

## 2025-02-02 PROCEDURE — 85025 COMPLETE CBC W/AUTO DIFF WBC: CPT

## 2025-02-02 PROCEDURE — 87502 INFLUENZA DNA AMP PROBE: CPT

## 2025-02-02 PROCEDURE — 84703 CHORIONIC GONADOTROPIN ASSAY: CPT

## 2025-02-02 PROCEDURE — 84484 ASSAY OF TROPONIN QUANT: CPT

## 2025-02-02 PROCEDURE — 93005 ELECTROCARDIOGRAM TRACING: CPT | Performed by: STUDENT IN AN ORGANIZED HEALTH CARE EDUCATION/TRAINING PROGRAM

## 2025-02-02 PROCEDURE — 80061 LIPID PANEL: CPT

## 2025-02-02 PROCEDURE — 84145 PROCALCITONIN (PCT): CPT

## 2025-02-02 PROCEDURE — 83880 ASSAY OF NATRIURETIC PEPTIDE: CPT

## 2025-02-02 PROCEDURE — 82803 BLOOD GASES ANY COMBINATION: CPT

## 2025-02-02 PROCEDURE — 99285 EMERGENCY DEPT VISIT HI MDM: CPT

## 2025-02-02 PROCEDURE — 6360000002 HC RX W HCPCS: Performed by: HOSPITALIST

## 2025-02-02 PROCEDURE — 83690 ASSAY OF LIPASE: CPT

## 2025-02-02 PROCEDURE — 80053 COMPREHEN METABOLIC PANEL: CPT

## 2025-02-02 PROCEDURE — 6370000000 HC RX 637 (ALT 250 FOR IP): Performed by: PHYSICIAN ASSISTANT

## 2025-02-02 PROCEDURE — 83735 ASSAY OF MAGNESIUM: CPT

## 2025-02-02 RX ORDER — FUROSEMIDE 10 MG/ML
20 INJECTION INTRAMUSCULAR; INTRAVENOUS 2 TIMES DAILY
Status: CANCELLED | OUTPATIENT
Start: 2025-02-02

## 2025-02-02 RX ORDER — TRAZODONE HYDROCHLORIDE 50 MG/1
200 TABLET, FILM COATED ORAL NIGHTLY
Status: DISCONTINUED | OUTPATIENT
Start: 2025-02-02 | End: 2025-02-02

## 2025-02-02 RX ORDER — POLYETHYLENE GLYCOL 3350 17 G/17G
17 POWDER, FOR SOLUTION ORAL DAILY PRN
Status: DISCONTINUED | OUTPATIENT
Start: 2025-02-02 | End: 2025-02-04 | Stop reason: HOSPADM

## 2025-02-02 RX ORDER — METOPROLOL TARTRATE 50 MG
50 TABLET ORAL 2 TIMES DAILY
Status: DISCONTINUED | OUTPATIENT
Start: 2025-02-02 | End: 2025-02-02

## 2025-02-02 RX ORDER — VENLAFAXINE 37.5 MG/1
75 TABLET ORAL 2 TIMES DAILY
Status: DISCONTINUED | OUTPATIENT
Start: 2025-02-02 | End: 2025-02-02

## 2025-02-02 RX ORDER — AMLODIPINE BESYLATE 5 MG/1
5 TABLET ORAL DAILY
Status: DISCONTINUED | OUTPATIENT
Start: 2025-02-02 | End: 2025-02-02

## 2025-02-02 RX ORDER — GABAPENTIN 400 MG/1
800 CAPSULE ORAL 3 TIMES DAILY
Status: DISCONTINUED | OUTPATIENT
Start: 2025-02-03 | End: 2025-02-04 | Stop reason: HOSPADM

## 2025-02-02 RX ORDER — ACETAMINOPHEN 325 MG/1
650 TABLET ORAL EVERY 6 HOURS PRN
Status: DISCONTINUED | OUTPATIENT
Start: 2025-02-02 | End: 2025-02-04 | Stop reason: HOSPADM

## 2025-02-02 RX ORDER — ASPIRIN 81 MG/1
81 TABLET ORAL DAILY
Status: DISCONTINUED | OUTPATIENT
Start: 2025-02-03 | End: 2025-02-04 | Stop reason: HOSPADM

## 2025-02-02 RX ORDER — ATORVASTATIN CALCIUM 80 MG/1
80 TABLET, FILM COATED ORAL NIGHTLY
Status: DISCONTINUED | OUTPATIENT
Start: 2025-02-02 | End: 2025-02-04 | Stop reason: HOSPADM

## 2025-02-02 RX ORDER — SODIUM CHLORIDE 9 MG/ML
INJECTION, SOLUTION INTRAVENOUS PRN
Status: DISCONTINUED | OUTPATIENT
Start: 2025-02-02 | End: 2025-02-04 | Stop reason: HOSPADM

## 2025-02-02 RX ORDER — POTASSIUM CHLORIDE 1500 MG/1
40 TABLET, EXTENDED RELEASE ORAL PRN
Status: DISCONTINUED | OUTPATIENT
Start: 2025-02-02 | End: 2025-02-04 | Stop reason: HOSPADM

## 2025-02-02 RX ORDER — PROCHLORPERAZINE EDISYLATE 5 MG/ML
10 INJECTION INTRAMUSCULAR; INTRAVENOUS EVERY 6 HOURS PRN
Status: DISCONTINUED | OUTPATIENT
Start: 2025-02-02 | End: 2025-02-04 | Stop reason: HOSPADM

## 2025-02-02 RX ORDER — HYDROXYZINE HYDROCHLORIDE 25 MG/1
25 TABLET, FILM COATED ORAL 3 TIMES DAILY
Status: DISCONTINUED | OUTPATIENT
Start: 2025-02-02 | End: 2025-02-04 | Stop reason: HOSPADM

## 2025-02-02 RX ORDER — ONDANSETRON 2 MG/ML
4 INJECTION INTRAMUSCULAR; INTRAVENOUS EVERY 6 HOURS PRN
Status: DISCONTINUED | OUTPATIENT
Start: 2025-02-02 | End: 2025-02-04 | Stop reason: HOSPADM

## 2025-02-02 RX ORDER — FUROSEMIDE 10 MG/ML
40 INJECTION INTRAMUSCULAR; INTRAVENOUS 2 TIMES DAILY
Status: DISCONTINUED | OUTPATIENT
Start: 2025-02-02 | End: 2025-02-04 | Stop reason: HOSPADM

## 2025-02-02 RX ORDER — METHYLPREDNISOLONE SODIUM SUCCINATE 40 MG/ML
40 INJECTION INTRAMUSCULAR; INTRAVENOUS DAILY
Status: CANCELLED | OUTPATIENT
Start: 2025-02-02

## 2025-02-02 RX ORDER — IBUPROFEN 200 MG
1 CAPSULE ORAL DAILY
COMMUNITY

## 2025-02-02 RX ORDER — ONDANSETRON 4 MG/1
4 TABLET, ORALLY DISINTEGRATING ORAL EVERY 8 HOURS PRN
Status: DISCONTINUED | OUTPATIENT
Start: 2025-02-02 | End: 2025-02-04 | Stop reason: HOSPADM

## 2025-02-02 RX ORDER — ENOXAPARIN SODIUM 100 MG/ML
60 INJECTION SUBCUTANEOUS 2 TIMES DAILY
Status: DISCONTINUED | OUTPATIENT
Start: 2025-02-02 | End: 2025-02-04 | Stop reason: HOSPADM

## 2025-02-02 RX ORDER — ASPIRIN 81 MG/1
81 TABLET, CHEWABLE ORAL DAILY
Status: DISCONTINUED | OUTPATIENT
Start: 2025-02-02 | End: 2025-02-02 | Stop reason: SDUPTHER

## 2025-02-02 RX ORDER — PAROXETINE 10 MG/1
20 TABLET, FILM COATED ORAL EVERY MORNING
COMMUNITY

## 2025-02-02 RX ORDER — POTASSIUM CHLORIDE 7.45 MG/ML
10 INJECTION INTRAVENOUS PRN
Status: DISCONTINUED | OUTPATIENT
Start: 2025-02-02 | End: 2025-02-04 | Stop reason: HOSPADM

## 2025-02-02 RX ORDER — FAMOTIDINE 20 MG/1
20 TABLET, FILM COATED ORAL 2 TIMES DAILY
Status: DISCONTINUED | OUTPATIENT
Start: 2025-02-03 | End: 2025-02-04 | Stop reason: HOSPADM

## 2025-02-02 RX ORDER — GUAIFENESIN/DEXTROMETHORPHAN 100-10MG/5
5 SYRUP ORAL EVERY 4 HOURS PRN
Status: DISCONTINUED | OUTPATIENT
Start: 2025-02-02 | End: 2025-02-04 | Stop reason: HOSPADM

## 2025-02-02 RX ORDER — HYDROXYZINE HYDROCHLORIDE 25 MG/1
25 TABLET, FILM COATED ORAL 2 TIMES DAILY
Status: DISCONTINUED | OUTPATIENT
Start: 2025-02-02 | End: 2025-02-02

## 2025-02-02 RX ORDER — LEVOFLOXACIN 5 MG/ML
750 INJECTION, SOLUTION INTRAVENOUS EVERY 24 HOURS
Status: DISCONTINUED | OUTPATIENT
Start: 2025-02-02 | End: 2025-02-04

## 2025-02-02 RX ORDER — GUAIFENESIN 600 MG/1
600 TABLET, EXTENDED RELEASE ORAL 2 TIMES DAILY
Status: DISCONTINUED | OUTPATIENT
Start: 2025-02-02 | End: 2025-02-04 | Stop reason: HOSPADM

## 2025-02-02 RX ORDER — EZETIMIBE 10 MG/1
10 TABLET ORAL DAILY
COMMUNITY

## 2025-02-02 RX ORDER — SODIUM CHLORIDE 0.9 % (FLUSH) 0.9 %
5-40 SYRINGE (ML) INJECTION PRN
Status: DISCONTINUED | OUTPATIENT
Start: 2025-02-02 | End: 2025-02-04 | Stop reason: HOSPADM

## 2025-02-02 RX ORDER — CYCLOBENZAPRINE HCL 10 MG
10 TABLET ORAL 2 TIMES DAILY
Status: DISCONTINUED | OUTPATIENT
Start: 2025-02-02 | End: 2025-02-04 | Stop reason: HOSPADM

## 2025-02-02 RX ORDER — ASPIRIN 81 MG/1
324 TABLET, CHEWABLE ORAL ONCE
Status: DISCONTINUED | OUTPATIENT
Start: 2025-02-02 | End: 2025-02-02

## 2025-02-02 RX ORDER — NITROGLYCERIN 0.4 MG/1
0.4 TABLET SUBLINGUAL EVERY 5 MIN PRN
Status: DISCONTINUED | OUTPATIENT
Start: 2025-02-02 | End: 2025-02-04 | Stop reason: HOSPADM

## 2025-02-02 RX ORDER — SODIUM CHLORIDE 0.9 % (FLUSH) 0.9 %
5-40 SYRINGE (ML) INJECTION EVERY 12 HOURS SCHEDULED
Status: DISCONTINUED | OUTPATIENT
Start: 2025-02-02 | End: 2025-02-04 | Stop reason: HOSPADM

## 2025-02-02 RX ORDER — ALBUTEROL SULFATE 0.83 MG/ML
2.5 SOLUTION RESPIRATORY (INHALATION)
Status: DISCONTINUED | OUTPATIENT
Start: 2025-02-02 | End: 2025-02-03

## 2025-02-02 RX ORDER — MULTIVITAMIN WITH IRON
1000 TABLET ORAL DAILY
Status: DISCONTINUED | OUTPATIENT
Start: 2025-02-02 | End: 2025-02-02

## 2025-02-02 RX ORDER — GABAPENTIN 400 MG/1
800 CAPSULE ORAL
Status: DISCONTINUED | OUTPATIENT
Start: 2025-02-02 | End: 2025-02-02

## 2025-02-02 RX ORDER — PANTOPRAZOLE SODIUM 40 MG/1
40 TABLET, DELAYED RELEASE ORAL 2 TIMES DAILY
COMMUNITY

## 2025-02-02 RX ORDER — ERGOCALCIFEROL 1.25 MG/1
50000 CAPSULE ORAL WEEKLY
COMMUNITY

## 2025-02-02 RX ORDER — OXYCODONE HYDROCHLORIDE 5 MG/1
5 TABLET ORAL ONCE
Status: DISCONTINUED | OUTPATIENT
Start: 2025-02-02 | End: 2025-02-02

## 2025-02-02 RX ORDER — ACETAMINOPHEN 650 MG/1
650 SUPPOSITORY RECTAL EVERY 6 HOURS PRN
Status: DISCONTINUED | OUTPATIENT
Start: 2025-02-02 | End: 2025-02-04 | Stop reason: HOSPADM

## 2025-02-02 RX ORDER — QUETIAPINE FUMARATE 100 MG/1
100 TABLET, FILM COATED ORAL DAILY
COMMUNITY

## 2025-02-02 RX ORDER — MAGNESIUM SULFATE IN WATER 40 MG/ML
2000 INJECTION, SOLUTION INTRAVENOUS PRN
Status: DISCONTINUED | OUTPATIENT
Start: 2025-02-02 | End: 2025-02-04 | Stop reason: HOSPADM

## 2025-02-02 RX ORDER — OXYCODONE HYDROCHLORIDE 10 MG/1
10 TABLET ORAL EVERY 6 HOURS PRN
Status: DISCONTINUED | OUTPATIENT
Start: 2025-02-02 | End: 2025-02-02

## 2025-02-02 RX ORDER — CARVEDILOL 25 MG/1
25 TABLET ORAL 2 TIMES DAILY WITH MEALS
COMMUNITY

## 2025-02-02 RX ORDER — MONTELUKAST SODIUM 10 MG/1
10 TABLET ORAL NIGHTLY
COMMUNITY

## 2025-02-02 RX ORDER — SPIRONOLACTONE 50 MG/1
50 TABLET, FILM COATED ORAL DAILY
COMMUNITY

## 2025-02-02 RX ORDER — ACETAMINOPHEN 325 MG/1
650 TABLET ORAL ONCE
Status: COMPLETED | OUTPATIENT
Start: 2025-02-02 | End: 2025-02-02

## 2025-02-02 RX ORDER — LOSARTAN POTASSIUM 100 MG/1
100 TABLET ORAL DAILY
COMMUNITY

## 2025-02-02 RX ORDER — LEVOTHYROXINE SODIUM 75 UG/1
75 TABLET ORAL DAILY
Status: DISCONTINUED | OUTPATIENT
Start: 2025-02-03 | End: 2025-02-04 | Stop reason: HOSPADM

## 2025-02-02 RX ORDER — HYDRALAZINE HYDROCHLORIDE 20 MG/ML
10 INJECTION INTRAMUSCULAR; INTRAVENOUS EVERY 4 HOURS PRN
Status: DISCONTINUED | OUTPATIENT
Start: 2025-02-02 | End: 2025-02-04 | Stop reason: HOSPADM

## 2025-02-02 RX ORDER — CETIRIZINE HYDROCHLORIDE 10 MG/1
10 TABLET ORAL DAILY
Status: DISCONTINUED | OUTPATIENT
Start: 2025-02-03 | End: 2025-02-04 | Stop reason: HOSPADM

## 2025-02-02 RX ADMIN — ACETAMINOPHEN 650 MG: 325 TABLET ORAL at 17:23

## 2025-02-02 RX ADMIN — HYDROMORPHONE HYDROCHLORIDE 0.5 MG: 1 INJECTION, SOLUTION INTRAMUSCULAR; INTRAVENOUS; SUBCUTANEOUS at 20:43

## 2025-02-02 RX ADMIN — LEVOFLOXACIN 750 MG: 5 INJECTION, SOLUTION INTRAVENOUS at 23:51

## 2025-02-02 RX ADMIN — ONDANSETRON 4 MG: 2 INJECTION INTRAMUSCULAR; INTRAVENOUS at 19:45

## 2025-02-02 RX ADMIN — OXYCODONE HYDROCHLORIDE 5 MG: 5 TABLET ORAL at 19:52

## 2025-02-02 RX ADMIN — HYDRALAZINE HYDROCHLORIDE 10 MG: 20 INJECTION INTRAMUSCULAR; INTRAVENOUS at 20:42

## 2025-02-02 RX ADMIN — HYDROMORPHONE HYDROCHLORIDE 0.5 MG: 1 INJECTION, SOLUTION INTRAMUSCULAR; INTRAVENOUS; SUBCUTANEOUS at 23:47

## 2025-02-02 ASSESSMENT — LIFESTYLE VARIABLES
HOW OFTEN DO YOU HAVE A DRINK CONTAINING ALCOHOL: NEVER
HOW MANY STANDARD DRINKS CONTAINING ALCOHOL DO YOU HAVE ON A TYPICAL DAY: PATIENT DOES NOT DRINK

## 2025-02-02 ASSESSMENT — PAIN SCALES - GENERAL
PAINLEVEL_OUTOF10: 8
PAINLEVEL_OUTOF10: 7
PAINLEVEL_OUTOF10: 10
PAINLEVEL_OUTOF10: 7

## 2025-02-02 ASSESSMENT — PAIN DESCRIPTION - DESCRIPTORS
DESCRIPTORS: ACHING
DESCRIPTORS: PATIENT UNABLE TO DESCRIBE

## 2025-02-02 ASSESSMENT — HEART SCORE: ECG: NON-SPECIFC REPOLARIZATION DISTURBANCE/LBTB/PM

## 2025-02-02 ASSESSMENT — PAIN DESCRIPTION - LOCATION
LOCATION: CHEST

## 2025-02-02 NOTE — H&P
Date: 2025  XR CHEST AP PORTABLE,  2025 2:42 AM CLINICAL HISTORY:  -soa COMPARISON:  2025 PROCEDURE COMMENTS: AP portable technique. FINDINGS: Support devices: No visible support devices. Cardiomegaly unchanged. Lungs are clear without effusion or pneumothorax.    Unchanged cardiomegaly. - Note: Radiology results need to be interpreted within a comprehensive clinical context.  If you have questions about the radiology report, please contact the office of the ordering clinician.    XR Abdomen Ap Only    Result Date: 2025  REVIEWING YOUR TEST RESULTS IN [facility] IS NOT A SUBSTITUTE FOR DISCUSSING THOSE RESULTS WITH YOUR HEALTH CARE PROVIDER. PLEASE CONTACT YOUR PROVIDER VIA [employer] TO DISCUSS ANY QUESTIONS OR CONCERNS YOU MAY HAVE REGARDING THESE TEST RESULTS.  RADIOLOGY REPORT FACILITY:  [facility] UNIT/AGE/GENDER: .ED  IN      AGE:54 Y          SEX:F PATIENT NAME/:  WALTER AGUILAR R    1970 UNIT NUMBER:  [id number] ACCOUNT NUMBER:  [id number] ACCESSION NUMBER:  [id number] CLINICAL HISTORY: nausea vomiting. COMPARISON:None FINDINGS: 3 supine AP views of the abdomen (is/are) performed. The bowel gas pattern is unremarkable without evidence of obstruction. Radiopaque densities identified along the right groin which is attributed to a prior right inguinal hernia repair. Surgical clips are identified within the right upper quadrant of the abdomen as well as near the stomach. There is no gross free air or suspicious calcification. The osseous structures are intact. IMPRESSION: Unremarkable bowel gas pattern. Dictated by: Lei Dixon M.D. Images and Report reviewed and interpreted by: Lei Dixon M.D. <PS><Electronically signed by: Lei Dixon M.D.> 2025 1431 D: 2025 1429 T: 2025 1429    XR Chest 1 Vw    Result Date: 2025  REVIEWING YOUR TEST RESULTS IN [facility] IS NOT A SUBSTITUTE FOR DISCUSSING THOSE RESULTS WITH YOUR 
See allergy section

## 2025-02-02 NOTE — ED TRIAGE NOTES
Pt c/o sob that started 3 days ago. At baseline Pt wears 2L NC. At the time of triage Pt is   93% on 2L. Pt also c/o vomiting.

## 2025-02-02 NOTE — ED PROVIDER NOTES
Emergency Department Attending Provider Note  Location: [facility] EMERGENCY DEPARTMENT  2/2/2025   Note Started: 4:59 PM EST 2/2/25       Patient Identification  Toshia Manjarrez is a 54 y.o. female      HPI:Toshia Manjarrez was evaluated in the Emergency Department for chest pain and shortness of breath.  Patient states that shortness of breath started 3 days ago.  Is associated with intermittent chest pain that she describes as a pressure associated with nausea send episodes of vomiting.  She was seen at an outside hospital yesterday and found to be hypercapnic.  She received breathing treatments and a CTA of the chest abdomen and pelvis that showed some groundglass infiltrates concerning for either edema or early infiltrates.  She was given antibiotics but states that her symptoms have not resolved and thus presented today for an evaluation.  She has a history of COPD as well as congestive heart failure and pulmonary hypertension.  She wears 2 L nasal cannula oxygen at baseline.  Has 3 stents.  Denies any fevers.  No calf swelling.  No pleuritic component to her pain.. Although initial history and physical exam information was obtained by LAINEY/NPP/MD/DO (who also dictated a record of this visit), I personally saw the patient and performed a substantive portion of the visit including all aspects of the medical decision making.      PHYSICAL EXAM:  Physical Exam  Constitutional:       General: She is not in acute distress.     Appearance: Normal appearance. She is obese.   HENT:      Head: Normocephalic and atraumatic.      Right Ear: External ear normal.      Left Ear: External ear normal.      Nose: Nose normal.      Mouth/Throat:      Mouth: Mucous membranes are moist.      Pharynx: Oropharynx is clear.   Cardiovascular:      Rate and Rhythm: Normal rate and regular rhythm.      Pulses: Normal pulses.      Heart sounds: Normal heart sounds.   Pulmonary:      Effort: Pulmonary effort is normal.      Breath 
from [facility] ED yesterday - trop nml, venous pH nml, pCO2 60 .  CTA chest abdomen pelvis done - see below.  Doxycycline was added to the zpak and steroids she was already on.    CT angiogram chest abdomen pelvis with contrast 2/1/2025 on Care Everywhere:  Impression    1.  No acute aortic syndrome.  2.  Mild bilateral patchy groundglass opacification may reflect mild edema or  pneumonia.  3.  No acute abnormality of the abdomen or pelvis.    NM myocardial perfusion 8/30/24 on Care Everywhere:  Impression    Conclusions    * ECG reported separately.    * Perfusion defects at stress and rest in the anterior and inferolateral  myocardium likely due to significant attenuation in these regions, but cannot  rule out underlying ischemia.  No ischemia in the remainder of the myocardium.    * Overall left ventricular systolic function was normal without regional  wall motion abnormalities.    * Left ventricular size is moderately enlarged.    Differential diagnosis includes COPD exacerbation, ACS, PE, dissection, pneumothorax, pneumonia, other    Labs showed leukocytosis of 13.1.  She has been on steroids.  Mild anemia with hemoglobin 10.8. metabolic panel grossly unremarkable.  Troponin 8.  BNP is not significantly elevated.  Flu and COVID-negative.  Chest x-ray shows pulmonary vascular congestion and cardiomegaly.  Patient with chest pain and heart score of 4.  Medicine consulted for admission.     On reeval, she appeared stable.      Heart Score for chest pain patients  History: Slightly Suspicious  ECG: Non-Specifc repolarization disturbance/LBTB/PM  Patient Age: > 45 and < 65 years  *Risk factors for Atherosclerotic disease: Diabetes Mellitus, Hypercholesterolemia, Hypertension, Obesity, Positive family History, Coronary Artery Disease  Risk Factors: > 3 Risk factors or history of atherosclerotic disease*  Troponin: < 1X normal limit  Heart Score Total: 4            Chronic Conditions: coronary artery

## 2025-02-03 ENCOUNTER — APPOINTMENT (OUTPATIENT)
Dept: CT IMAGING | Age: 55
DRG: 394 | End: 2025-02-03
Payer: MEDICARE

## 2025-02-03 LAB
ANION GAP SERPL CALCULATED.3IONS-SCNC: 10 MMOL/L (ref 3–16)
BASOPHILS # BLD: 0.1 K/UL (ref 0–0.2)
BASOPHILS NFR BLD: 1 %
BUN SERPL-MCNC: 20 MG/DL (ref 7–20)
CALCIUM SERPL-MCNC: 8.4 MG/DL (ref 8.3–10.6)
CHLORIDE SERPL-SCNC: 104 MMOL/L (ref 99–110)
CO2 SERPL-SCNC: 29 MMOL/L (ref 21–32)
CREAT SERPL-MCNC: 0.7 MG/DL (ref 0.6–1.1)
DEPRECATED RDW RBC AUTO: 17.1 % (ref 12.4–15.4)
EKG ATRIAL RATE: 85 BPM
EKG DIAGNOSIS: NORMAL
EKG P AXIS: 41 DEGREES
EKG P-R INTERVAL: 154 MS
EKG Q-T INTERVAL: 368 MS
EKG QRS DURATION: 84 MS
EKG QTC CALCULATION (BAZETT): 437 MS
EKG R AXIS: 26 DEGREES
EKG T AXIS: 35 DEGREES
EKG VENTRICULAR RATE: 85 BPM
EOSINOPHIL # BLD: 0.2 K/UL (ref 0–0.6)
EOSINOPHIL NFR BLD: 1.7 %
GFR SERPLBLD CREATININE-BSD FMLA CKD-EPI: >90 ML/MIN/{1.73_M2}
GLUCOSE BLD-MCNC: 145 MG/DL (ref 70–99)
GLUCOSE SERPL-MCNC: 150 MG/DL (ref 70–99)
HCT VFR BLD AUTO: 35.9 % (ref 36–48)
HGB BLD-MCNC: 11.5 G/DL (ref 12–16)
LYMPHOCYTES # BLD: 2 K/UL (ref 1–5.1)
LYMPHOCYTES NFR BLD: 17.7 %
MCH RBC QN AUTO: 25.6 PG (ref 26–34)
MCHC RBC AUTO-ENTMCNC: 32 G/DL (ref 31–36)
MCV RBC AUTO: 80 FL (ref 80–100)
MONOCYTES # BLD: 0.7 K/UL (ref 0–1.3)
MONOCYTES NFR BLD: 6.6 %
MRSA DNA SPEC QL NAA+PROBE: NORMAL
NEUTROPHILS # BLD: 8.2 K/UL (ref 1.7–7.7)
NEUTROPHILS NFR BLD: 73 %
PERFORMED ON: ABNORMAL
PLATELET # BLD AUTO: 245 K/UL (ref 135–450)
PMV BLD AUTO: 8.3 FL (ref 5–10.5)
POTASSIUM SERPL-SCNC: 3.8 MMOL/L (ref 3.5–5.1)
RBC # BLD AUTO: 4.48 M/UL (ref 4–5.2)
SODIUM SERPL-SCNC: 143 MMOL/L (ref 136–145)
TROPONIN, HIGH SENSITIVITY: 8 NG/L (ref 0–14)
TROPONIN, HIGH SENSITIVITY: 9 NG/L (ref 0–14)
WBC # BLD AUTO: 11.2 K/UL (ref 4–11)

## 2025-02-03 PROCEDURE — 36415 COLL VENOUS BLD VENIPUNCTURE: CPT

## 2025-02-03 PROCEDURE — 6360000002 HC RX W HCPCS: Performed by: HOSPITALIST

## 2025-02-03 PROCEDURE — 94640 AIRWAY INHALATION TREATMENT: CPT

## 2025-02-03 PROCEDURE — 6370000000 HC RX 637 (ALT 250 FOR IP): Performed by: NURSE PRACTITIONER

## 2025-02-03 PROCEDURE — 6370000000 HC RX 637 (ALT 250 FOR IP)

## 2025-02-03 PROCEDURE — 6370000000 HC RX 637 (ALT 250 FOR IP): Performed by: HOSPITALIST

## 2025-02-03 PROCEDURE — 74176 CT ABD & PELVIS W/O CONTRAST: CPT

## 2025-02-03 PROCEDURE — 85025 COMPLETE CBC W/AUTO DIFF WBC: CPT

## 2025-02-03 PROCEDURE — 1200000000 HC SEMI PRIVATE

## 2025-02-03 PROCEDURE — 99223 1ST HOSP IP/OBS HIGH 75: CPT | Performed by: INTERNAL MEDICINE

## 2025-02-03 PROCEDURE — 2500000003 HC RX 250 WO HCPCS: Performed by: HOSPITALIST

## 2025-02-03 PROCEDURE — 93010 ELECTROCARDIOGRAM REPORT: CPT | Performed by: INTERNAL MEDICINE

## 2025-02-03 PROCEDURE — 87641 MR-STAPH DNA AMP PROBE: CPT

## 2025-02-03 PROCEDURE — 2700000000 HC OXYGEN THERAPY PER DAY

## 2025-02-03 PROCEDURE — 99222 1ST HOSP IP/OBS MODERATE 55: CPT | Performed by: INTERNAL MEDICINE

## 2025-02-03 PROCEDURE — 6360000004 HC RX CONTRAST MEDICATION: Performed by: INTERNAL MEDICINE

## 2025-02-03 PROCEDURE — 94760 N-INVAS EAR/PLS OXIMETRY 1: CPT

## 2025-02-03 PROCEDURE — 84484 ASSAY OF TROPONIN QUANT: CPT

## 2025-02-03 PROCEDURE — 80048 BASIC METABOLIC PNL TOTAL CA: CPT

## 2025-02-03 RX ORDER — OXYCODONE AND ACETAMINOPHEN 5; 325 MG/1; MG/1
1 TABLET ORAL ONCE
Status: COMPLETED | OUTPATIENT
Start: 2025-02-03 | End: 2025-02-03

## 2025-02-03 RX ORDER — IOPAMIDOL 612 MG/ML
50 INJECTION, SOLUTION INTRAVASCULAR
Status: COMPLETED | OUTPATIENT
Start: 2025-02-03 | End: 2025-02-03

## 2025-02-03 RX ORDER — ALBUTEROL SULFATE 0.83 MG/ML
2.5 SOLUTION RESPIRATORY (INHALATION) EVERY 4 HOURS PRN
Status: DISCONTINUED | OUTPATIENT
Start: 2025-02-03 | End: 2025-02-04 | Stop reason: HOSPADM

## 2025-02-03 RX ADMIN — FAMOTIDINE 20 MG: 20 TABLET, FILM COATED ORAL at 09:21

## 2025-02-03 RX ADMIN — NITROGLYCERIN 0.4 MG: 0.4 TABLET SUBLINGUAL at 02:10

## 2025-02-03 RX ADMIN — SODIUM CHLORIDE, PRESERVATIVE FREE 10 ML: 5 INJECTION INTRAVENOUS at 09:23

## 2025-02-03 RX ADMIN — ATORVASTATIN CALCIUM 80 MG: 80 TABLET, FILM COATED ORAL at 21:43

## 2025-02-03 RX ADMIN — IOPAMIDOL 50 ML: 612 INJECTION, SOLUTION INTRAVENOUS at 20:51

## 2025-02-03 RX ADMIN — GUAIFENESIN 600 MG: 600 TABLET ORAL at 09:21

## 2025-02-03 RX ADMIN — ASPIRIN 81 MG: 81 TABLET, COATED ORAL at 09:21

## 2025-02-03 RX ADMIN — HYDROMORPHONE HYDROCHLORIDE 0.5 MG: 1 INJECTION, SOLUTION INTRAMUSCULAR; INTRAVENOUS; SUBCUTANEOUS at 09:21

## 2025-02-03 RX ADMIN — HYDROMORPHONE HYDROCHLORIDE 0.5 MG: 1 INJECTION, SOLUTION INTRAMUSCULAR; INTRAVENOUS; SUBCUTANEOUS at 16:01

## 2025-02-03 RX ADMIN — ONDANSETRON 4 MG: 2 INJECTION INTRAMUSCULAR; INTRAVENOUS at 01:43

## 2025-02-03 RX ADMIN — HYDROMORPHONE HYDROCHLORIDE 0.5 MG: 1 INJECTION, SOLUTION INTRAMUSCULAR; INTRAVENOUS; SUBCUTANEOUS at 22:29

## 2025-02-03 RX ADMIN — HYDROMORPHONE HYDROCHLORIDE 0.5 MG: 1 INJECTION, SOLUTION INTRAMUSCULAR; INTRAVENOUS; SUBCUTANEOUS at 12:43

## 2025-02-03 RX ADMIN — OXYCODONE HYDROCHLORIDE AND ACETAMINOPHEN 1 TABLET: 5; 325 TABLET ORAL at 03:54

## 2025-02-03 RX ADMIN — HYDROMORPHONE HYDROCHLORIDE 0.5 MG: 1 INJECTION, SOLUTION INTRAMUSCULAR; INTRAVENOUS; SUBCUTANEOUS at 19:30

## 2025-02-03 RX ADMIN — HYDROXYZINE HYDROCHLORIDE 25 MG: 25 TABLET, FILM COATED ORAL at 12:43

## 2025-02-03 RX ADMIN — NITROGLYCERIN 0.4 MG: 0.4 TABLET SUBLINGUAL at 01:42

## 2025-02-03 RX ADMIN — SODIUM CHLORIDE, PRESERVATIVE FREE 10 ML: 5 INJECTION INTRAVENOUS at 16:02

## 2025-02-03 RX ADMIN — ENOXAPARIN SODIUM 60 MG: 100 INJECTION SUBCUTANEOUS at 09:23

## 2025-02-03 RX ADMIN — HYDRALAZINE HYDROCHLORIDE 10 MG: 20 INJECTION INTRAMUSCULAR; INTRAVENOUS at 01:43

## 2025-02-03 RX ADMIN — GABAPENTIN 800 MG: 400 CAPSULE ORAL at 21:43

## 2025-02-03 RX ADMIN — LEVOFLOXACIN 750 MG: 5 INJECTION, SOLUTION INTRAVENOUS at 22:01

## 2025-02-03 RX ADMIN — FUROSEMIDE 40 MG: 10 INJECTION, SOLUTION INTRAMUSCULAR; INTRAVENOUS at 01:13

## 2025-02-03 RX ADMIN — HYDROMORPHONE HYDROCHLORIDE 0.5 MG: 1 INJECTION, SOLUTION INTRAMUSCULAR; INTRAVENOUS; SUBCUTANEOUS at 02:51

## 2025-02-03 RX ADMIN — CETIRIZINE HYDROCHLORIDE 10 MG: 10 TABLET, FILM COATED ORAL at 09:21

## 2025-02-03 RX ADMIN — CYCLOBENZAPRINE 10 MG: 10 TABLET, FILM COATED ORAL at 21:43

## 2025-02-03 RX ADMIN — ONDANSETRON 4 MG: 4 TABLET, ORALLY DISINTEGRATING ORAL at 08:48

## 2025-02-03 RX ADMIN — ONDANSETRON 4 MG: 2 INJECTION INTRAMUSCULAR; INTRAVENOUS at 21:59

## 2025-02-03 RX ADMIN — HYDROXYZINE HYDROCHLORIDE 25 MG: 25 TABLET, FILM COATED ORAL at 09:21

## 2025-02-03 RX ADMIN — FUROSEMIDE 40 MG: 10 INJECTION, SOLUTION INTRAMUSCULAR; INTRAVENOUS at 09:22

## 2025-02-03 RX ADMIN — SODIUM CHLORIDE, PRESERVATIVE FREE 10 ML: 5 INJECTION INTRAVENOUS at 01:13

## 2025-02-03 RX ADMIN — ONDANSETRON 4 MG: 2 INJECTION INTRAMUSCULAR; INTRAVENOUS at 14:47

## 2025-02-03 RX ADMIN — NITROGLYCERIN 0.4 MG: 0.4 TABLET SUBLINGUAL at 02:04

## 2025-02-03 RX ADMIN — HYDROMORPHONE HYDROCHLORIDE 0.5 MG: 1 INJECTION, SOLUTION INTRAMUSCULAR; INTRAVENOUS; SUBCUTANEOUS at 05:54

## 2025-02-03 RX ADMIN — SODIUM CHLORIDE, PRESERVATIVE FREE 10 ML: 5 INJECTION INTRAVENOUS at 19:30

## 2025-02-03 RX ADMIN — GABAPENTIN 800 MG: 400 CAPSULE ORAL at 09:21

## 2025-02-03 RX ADMIN — FAMOTIDINE 20 MG: 20 TABLET, FILM COATED ORAL at 21:43

## 2025-02-03 RX ADMIN — ENOXAPARIN SODIUM 60 MG: 100 INJECTION SUBCUTANEOUS at 21:42

## 2025-02-03 RX ADMIN — HYDROXYZINE HYDROCHLORIDE 25 MG: 25 TABLET, FILM COATED ORAL at 19:30

## 2025-02-03 RX ADMIN — CYCLOBENZAPRINE 10 MG: 10 TABLET, FILM COATED ORAL at 09:21

## 2025-02-03 RX ADMIN — LEVOTHYROXINE SODIUM 75 MCG: 0.07 TABLET ORAL at 05:54

## 2025-02-03 RX ADMIN — FUROSEMIDE 40 MG: 10 INJECTION, SOLUTION INTRAMUSCULAR; INTRAVENOUS at 19:30

## 2025-02-03 RX ADMIN — ENOXAPARIN SODIUM 60 MG: 100 INJECTION SUBCUTANEOUS at 01:12

## 2025-02-03 ASSESSMENT — PAIN DESCRIPTION - LOCATION
LOCATION: CHEST
LOCATION: GENERALIZED
LOCATION: CHEST
LOCATION: CHEST
LOCATION: ABDOMEN;CHEST
LOCATION: CHEST

## 2025-02-03 ASSESSMENT — PAIN SCALES - GENERAL
PAINLEVEL_OUTOF10: 7
PAINLEVEL_OUTOF10: 4
PAINLEVEL_OUTOF10: 7
PAINLEVEL_OUTOF10: 2
PAINLEVEL_OUTOF10: 7
PAINLEVEL_OUTOF10: 3
PAINLEVEL_OUTOF10: 7
PAINLEVEL_OUTOF10: 1
PAINLEVEL_OUTOF10: 2
PAINLEVEL_OUTOF10: 7
PAINLEVEL_OUTOF10: 5
PAINLEVEL_OUTOF10: 7
PAINLEVEL_OUTOF10: 4

## 2025-02-03 ASSESSMENT — PAIN DESCRIPTION - DESCRIPTORS
DESCRIPTORS: TIGHTNESS
DESCRIPTORS: ACHING
DESCRIPTORS: TIGHTNESS
DESCRIPTORS: TIGHTNESS
DESCRIPTORS: SHARP;PRESSURE
DESCRIPTORS: SHARP
DESCRIPTORS: ACHING
DESCRIPTORS: TIGHTNESS

## 2025-02-03 ASSESSMENT — PAIN DESCRIPTION - ORIENTATION
ORIENTATION: MID
ORIENTATION: LOWER;MID
ORIENTATION: MID

## 2025-02-03 ASSESSMENT — PAIN DESCRIPTION - PAIN TYPE
TYPE: ACUTE PAIN
TYPE: ACUTE PAIN

## 2025-02-03 ASSESSMENT — PAIN - FUNCTIONAL ASSESSMENT
PAIN_FUNCTIONAL_ASSESSMENT: ACTIVITIES ARE NOT PREVENTED

## 2025-02-03 ASSESSMENT — PAIN SCALES - WONG BAKER
WONGBAKER_NUMERICALRESPONSE: NO HURT
WONGBAKER_NUMERICALRESPONSE: NO HURT

## 2025-02-03 ASSESSMENT — PAIN DESCRIPTION - FREQUENCY
FREQUENCY: CONTINUOUS
FREQUENCY: CONTINUOUS

## 2025-02-03 NOTE — CONSULTS
VIEW OF THE CHEST    2/2/2025 4:35 pm    COMPARISON:  01/13/2023    HISTORY:  ORDERING SYSTEM PROVIDED HISTORY: SOB  TECHNOLOGIST PROVIDED HISTORY:  Reason for exam:->SOB  Reason for Exam: sob, covid rule out    FINDINGS:  Cardiomegaly.  Pulmonary vascular congestion.  No focal airspace disease.  No  pneumothorax.  No pleural effusion.  Scarring again seen left parahilar  region.    Impression  Cardiomegaly with pulmonary vascular congestion

## 2025-02-03 NOTE — CONSULTS
[facility]    Toshia Manjarrez  1970    February 3, 2025    Reason for Consult: Chest Pain    CC: Chest Pain    HPI:  The patient is 54 y.o. female with a past medical history significant for Rou-en-Y gastric surgery, asthma with chronic hypoxic respiratory failure, morbid obesity and CAD with prior PCI at [facility] who presented to [facility] ED with shortness of breath and chest pressure. I was asked to see her for this.     Toshia states that she has had chest pressure for several days. She does endorse some pain into her back. This may start in the left side of her chest and penetrate through to her shoulder blade. This may come on with doing mild exertion. Yesterday, she was putting a load of laundry in when it started. The pain started yesterday about noon and has not really gone away. She rates it a 5 out of 10 in severity.  She does endorse shortness of breath at baseline which she attributes to her asthma and COPD.    Review of Systems:  Constitutional: No fatigue, weakness, night sweats or fever.   HEENT: No new vision difficulties or ringing in the ears.  Respiratory: Positive for chronic SOB. Wears Zpap at night. No PND, orthopnea or cough.   Cardiovascular: See HPI   GI: No n/v, diarrhea, constipation, abdominal pain or changes in bowel habits.  No melena, no hematochezia  : No urinary frequency, urgency, incontinence, hematuria or dysuria.  Skin: No cyanosis or skin lesions.  Musculoskeletal: No new muscle or joint pain.  Neurological: No syncope or TIA-like symptoms.  Psychiatric: No anxiety, insomnia or depression     Past Medical History:   Diagnosis Date    CAD (coronary artery disease)     Cath 09 100% RCA w/ collaterals, nonobstructive dz LCX, LVEF 55%- med tx. Cath 9/27/12- known RCA occlusion, nonobstructive dz of LAD and LCx, LVEF 60%- Med tx.     Carotid stenosis     Cellulitis     CHF (congestive heart failure) ([facility])     Echo 9/2012 normal    Diabetes ([facility])

## 2025-02-03 NOTE — PLAN OF CARE
Problem: Pain  Goal: Verbalizes/displays adequate comfort level or baseline comfort level  Outcome: Progressing  Flowsheets (Taken 2/3/2025 0207)  Verbalizes/displays adequate comfort level or baseline comfort level:   Encourage patient to monitor pain and request assistance   Administer analgesics based on type and severity of pain and evaluate response   Assess pain using appropriate pain scale   Implement non-pharmacological measures as appropriate and evaluate response   Notify Licensed Independent Practitioner if interventions unsuccessful or patient reports new pain     Problem: Respiratory - Adult  Goal: Achieves optimal ventilation and oxygenation  Outcome: Progressing  Flowsheets (Taken 2/3/2025 0207)  Achieves optimal ventilation and oxygenation:   Assess for changes in respiratory status   Position to facilitate oxygenation and minimize respiratory effort

## 2025-02-03 NOTE — FLOWSHEET NOTE
Pt R AC IV found to be leaking blood at site. Flushed and no active bleeding found. Removed old dressing, cleaned site, and placed new dressing. IV was able to be used w/o bleeding or leaking. Later site bleeding again. IV Dc'ed and new one placed via US.      02/03/25 0921 02/03/25 1243 02/03/25 1300   Peripheral IV 02/02/25 Right Antecubital   Placement Date/Time: 02/02/25 1712   Present on Admission/Arrival: No  Size: 18 g  Orientation: Right  Location: Antecubital  Hand hygiene completed: Yes  Site prep: Chlorhexidine  Inserted by: Jarrell TIMMONS  Local Anesthetic: None  Technique: Ultrasound g...   Site Assessment Intact Intact;Bleeding Clean, dry & intact   Line Status Flushed;Normal saline locked;Capped  --  Brisk blood return;Flushed;Normal saline locked;Capped   Line Care Connections checked and tightened;Ports disinfected;Cap changed  --  Connections checked and tightened;Ports disinfected;Cap changed   Phlebitis Assessment No symptoms No symptoms No symptoms   Infiltration Assessment 0 0 0   Alcohol Cap Used Yes  --  Yes   Dressing Status Old drainage noted New drainage noted Clean, dry & intact   Dressing Type Transparent Transparent Transparent   Dressing Intervention  --  Dressing changed  --    Peripheral IV 02/03/25 Distal;Right;Anterior Forearm   Placement Date/Time: 02/03/25 1545   Present on Admission/Arrival: No  Size: 22 g  Orientation: Distal;Right;Anterior  Location: Forearm  Hand hygiene completed: Yes  Site prep: Chlorhexidine  Inserted by: Willa CRUZ RN  Local Anesthetic: None  Techni...   Site Assessment  --   --   --    Line Status  --   --   --    Line Care  --   --   --    Phlebitis Assessment  --   --   --    Infiltration Assessment  --   --   --    Alcohol Cap Used  --   --   --    Dressing Status  --   --   --    Dressing Type  --   --   --    Dressing Intervention  --   --   --       02/03/25 1450 02/03/25 1545   Peripheral IV 02/02/25 Right Antecubital   Placement Date/Time: 02/02/25

## 2025-02-04 ENCOUNTER — APPOINTMENT (OUTPATIENT)
Age: 55
DRG: 394 | End: 2025-02-04
Attending: HOSPITALIST
Payer: MEDICARE

## 2025-02-04 VITALS
HEIGHT: 65 IN | OXYGEN SATURATION: 97 % | DIASTOLIC BLOOD PRESSURE: 73 MMHG | TEMPERATURE: 97.9 F | BODY MASS INDEX: 48.82 KG/M2 | WEIGHT: 293 LBS | SYSTOLIC BLOOD PRESSURE: 121 MMHG | HEART RATE: 75 BPM | RESPIRATION RATE: 18 BRPM

## 2025-02-04 LAB
ANION GAP SERPL CALCULATED.3IONS-SCNC: 8 MMOL/L (ref 3–16)
BASOPHILS # BLD: 0.1 K/UL (ref 0–0.2)
BASOPHILS NFR BLD: 0.5 %
BUN SERPL-MCNC: 19 MG/DL (ref 7–20)
CALCIUM SERPL-MCNC: 8.6 MG/DL (ref 8.3–10.6)
CHLORIDE SERPL-SCNC: 97 MMOL/L (ref 99–110)
CO2 SERPL-SCNC: 35 MMOL/L (ref 21–32)
CREAT SERPL-MCNC: 0.6 MG/DL (ref 0.6–1.1)
DEPRECATED RDW RBC AUTO: 16.9 % (ref 12.4–15.4)
EOSINOPHIL # BLD: 0.2 K/UL (ref 0–0.6)
EOSINOPHIL NFR BLD: 1.5 %
GFR SERPLBLD CREATININE-BSD FMLA CKD-EPI: >90 ML/MIN/{1.73_M2}
GLUCOSE SERPL-MCNC: 155 MG/DL (ref 70–99)
HCT VFR BLD AUTO: 34.1 % (ref 36–48)
HGB BLD-MCNC: 10.9 G/DL (ref 12–16)
LYMPHOCYTES # BLD: 1.4 K/UL (ref 1–5.1)
LYMPHOCYTES NFR BLD: 13 %
MCH RBC QN AUTO: 25.2 PG (ref 26–34)
MCHC RBC AUTO-ENTMCNC: 31.9 G/DL (ref 31–36)
MCV RBC AUTO: 79.1 FL (ref 80–100)
MONOCYTES # BLD: 0.8 K/UL (ref 0–1.3)
MONOCYTES NFR BLD: 7.3 %
NEUTROPHILS # BLD: 8.5 K/UL (ref 1.7–7.7)
NEUTROPHILS NFR BLD: 77.7 %
PLATELET # BLD AUTO: 240 K/UL (ref 135–450)
PMV BLD AUTO: 8.7 FL (ref 5–10.5)
POTASSIUM SERPL-SCNC: 3.4 MMOL/L (ref 3.5–5.1)
RBC # BLD AUTO: 4.31 M/UL (ref 4–5.2)
SODIUM SERPL-SCNC: 140 MMOL/L (ref 136–145)
WBC # BLD AUTO: 10.9 K/UL (ref 4–11)

## 2025-02-04 PROCEDURE — 99233 SBSQ HOSP IP/OBS HIGH 50: CPT | Performed by: INTERNAL MEDICINE

## 2025-02-04 PROCEDURE — 94660 CPAP INITIATION&MGMT: CPT

## 2025-02-04 PROCEDURE — 6360000002 HC RX W HCPCS: Performed by: HOSPITALIST

## 2025-02-04 PROCEDURE — 80048 BASIC METABOLIC PNL TOTAL CA: CPT

## 2025-02-04 PROCEDURE — 6370000000 HC RX 637 (ALT 250 FOR IP): Performed by: HOSPITALIST

## 2025-02-04 PROCEDURE — 6370000000 HC RX 637 (ALT 250 FOR IP): Performed by: STUDENT IN AN ORGANIZED HEALTH CARE EDUCATION/TRAINING PROGRAM

## 2025-02-04 PROCEDURE — 36415 COLL VENOUS BLD VENIPUNCTURE: CPT

## 2025-02-04 PROCEDURE — 2500000003 HC RX 250 WO HCPCS: Performed by: HOSPITALIST

## 2025-02-04 PROCEDURE — 94761 N-INVAS EAR/PLS OXIMETRY MLT: CPT

## 2025-02-04 PROCEDURE — 5A09357 ASSISTANCE WITH RESPIRATORY VENTILATION, LESS THAN 24 CONSECUTIVE HOURS, CONTINUOUS POSITIVE AIRWAY PRESSURE: ICD-10-PCS | Performed by: INTERNAL MEDICINE

## 2025-02-04 PROCEDURE — 2700000000 HC OXYGEN THERAPY PER DAY

## 2025-02-04 PROCEDURE — 6370000000 HC RX 637 (ALT 250 FOR IP): Performed by: NURSE PRACTITIONER

## 2025-02-04 PROCEDURE — 85025 COMPLETE CBC W/AUTO DIFF WBC: CPT

## 2025-02-04 RX ORDER — OXYCODONE AND ACETAMINOPHEN 5; 325 MG/1; MG/1
1 TABLET ORAL ONCE
Status: COMPLETED | OUTPATIENT
Start: 2025-02-04 | End: 2025-02-04

## 2025-02-04 RX ADMIN — CETIRIZINE HYDROCHLORIDE 10 MG: 10 TABLET, FILM COATED ORAL at 09:18

## 2025-02-04 RX ADMIN — FUROSEMIDE 40 MG: 10 INJECTION, SOLUTION INTRAMUSCULAR; INTRAVENOUS at 09:18

## 2025-02-04 RX ADMIN — SODIUM CHLORIDE, PRESERVATIVE FREE 10 ML: 5 INJECTION INTRAVENOUS at 09:19

## 2025-02-04 RX ADMIN — ASPIRIN 81 MG: 81 TABLET, COATED ORAL at 09:18

## 2025-02-04 RX ADMIN — FAMOTIDINE 20 MG: 20 TABLET, FILM COATED ORAL at 09:18

## 2025-02-04 RX ADMIN — HYDROXYZINE HYDROCHLORIDE 25 MG: 25 TABLET, FILM COATED ORAL at 09:17

## 2025-02-04 RX ADMIN — LEVOTHYROXINE SODIUM 75 MCG: 0.07 TABLET ORAL at 05:43

## 2025-02-04 RX ADMIN — OXYCODONE HYDROCHLORIDE AND ACETAMINOPHEN 1 TABLET: 5; 325 TABLET ORAL at 13:04

## 2025-02-04 RX ADMIN — HYDROMORPHONE HYDROCHLORIDE 0.5 MG: 1 INJECTION, SOLUTION INTRAMUSCULAR; INTRAVENOUS; SUBCUTANEOUS at 02:32

## 2025-02-04 RX ADMIN — GABAPENTIN 800 MG: 400 CAPSULE ORAL at 08:14

## 2025-02-04 RX ADMIN — HYDROXYZINE HYDROCHLORIDE 25 MG: 25 TABLET, FILM COATED ORAL at 12:04

## 2025-02-04 RX ADMIN — ENOXAPARIN SODIUM 60 MG: 100 INJECTION SUBCUTANEOUS at 09:18

## 2025-02-04 RX ADMIN — GABAPENTIN 800 MG: 400 CAPSULE ORAL at 13:04

## 2025-02-04 RX ADMIN — GUAIFENESIN 600 MG: 600 TABLET ORAL at 12:04

## 2025-02-04 RX ADMIN — HYDROMORPHONE HYDROCHLORIDE 0.5 MG: 1 INJECTION, SOLUTION INTRAMUSCULAR; INTRAVENOUS; SUBCUTANEOUS at 09:18

## 2025-02-04 RX ADMIN — CYCLOBENZAPRINE 10 MG: 10 TABLET, FILM COATED ORAL at 08:14

## 2025-02-04 RX ADMIN — HYDROMORPHONE HYDROCHLORIDE 0.5 MG: 1 INJECTION, SOLUTION INTRAMUSCULAR; INTRAVENOUS; SUBCUTANEOUS at 05:43

## 2025-02-04 RX ADMIN — POTASSIUM CHLORIDE 40 MEQ: 1500 TABLET, EXTENDED RELEASE ORAL at 12:04

## 2025-02-04 ASSESSMENT — PAIN DESCRIPTION - FREQUENCY
FREQUENCY: CONTINUOUS

## 2025-02-04 ASSESSMENT — PAIN SCALES - GENERAL
PAINLEVEL_OUTOF10: 7
PAINLEVEL_OUTOF10: 3
PAINLEVEL_OUTOF10: 5
PAINLEVEL_OUTOF10: 1
PAINLEVEL_OUTOF10: 2
PAINLEVEL_OUTOF10: 4
PAINLEVEL_OUTOF10: 7

## 2025-02-04 ASSESSMENT — PAIN DESCRIPTION - DESCRIPTORS
DESCRIPTORS: PRESSURE;SHARP
DESCRIPTORS: PRESSURE;SHARP
DESCRIPTORS: ACHING;PRESSURE
DESCRIPTORS: ACHING;PRESSURE
DESCRIPTORS: PRESSURE;SHARP

## 2025-02-04 ASSESSMENT — PAIN - FUNCTIONAL ASSESSMENT
PAIN_FUNCTIONAL_ASSESSMENT: ACTIVITIES ARE NOT PREVENTED

## 2025-02-04 ASSESSMENT — PAIN DESCRIPTION - PAIN TYPE
TYPE: ACUTE PAIN

## 2025-02-04 ASSESSMENT — PAIN DESCRIPTION - LOCATION
LOCATION: ABDOMEN

## 2025-02-04 ASSESSMENT — PAIN DESCRIPTION - ORIENTATION
ORIENTATION: MID
ORIENTATION: MID;UPPER
ORIENTATION: MID;UPPER
ORIENTATION: UPPER
ORIENTATION: MID;UPPER

## 2025-02-04 ASSESSMENT — PAIN DESCRIPTION - ONSET
ONSET: GRADUAL
ONSET: GRADUAL

## 2025-02-04 NOTE — PROGRESS NOTES
CT tech called to confirm pt to get another cta abd/pelvis w/contrast done as ordered by Dr Blancas. Pt had one done 2/1/25. Messaged him, but messaged forwarded to Kelly GELLER. Explained the situation to see if she and confirm another is needed. Will await reply.    Electronically signed by Willa Hopkins RN on 2/3/2025 at 5:47 PM    Kelly relays to dc do CT abdomen and pelvis with PO contrast only instead. Ordered.     Called CT to update; they are to send oral contrast.    Electronically signed by Willa Hopkins RN on 2/3/2025 at 6:05 PM      Transport at bedside to  pt for CT @1930. Contrast had not been started. Obtained and given to pt. Called CT and informed. Pt to drink over an hour. Keo TIMMONS to coordinate intake and subsequent CT time.    Electronically signed by Willa Hopkins RN on 2/3/2025 at 9:04 PM        
Called to room because pt actively vomiting. Has about 200 mL of liquid emesis. Pt believes it is her soup from lunch. Pt had Zofran SL w/am meds and per pop-up for more Zofran (IV), it's too early to give. Messaged Lisette Parisi MD to see if ok to give early. Lisette Parisi MD relays ok to give early.    Pt also says abd LUQ feels like there is twisting and she felt a \"pop\". Lisette Parisi MD relays to watch for now. Will do.    Electronically signed by Willa Hopkins RN on 2/3/2025 at 2:45 PM    
Discharge instructions reviewed with patient. There have been no changes in her medications. Did inform her that she needs to follow up with her PCP within a week. Pt verbalized understanding. Pt is waiting for her  to pick her up. IV removed. Monitor removed and returned to [facility].  
Dr. Brasher in to see pt.  
ED TO INPATIENT SBAR HANDOFF    Patient Name: Toshia Manjarrez   :  1970  54 y.o.   MRN:  [id number]  Preferred Name    ED Room #:  005/B-05  Family/Caregiver Present no   Restraints no   Sitter no   Sepsis Risk Score      Situation  Code Status: Full Code No additional code details.    Allergies: Lortab [hydrocodone-acetaminophen], Vancomycin, Hydrocodone, Lisinopril, Morphine, Adhesive tape, and Sulfa antibiotics  Weight: Patient Vitals for the past 96 hrs (Last 3 readings):   Weight   25 1600 (!) 189.2 kg (417 lb 1.8 oz)     Arrived from: home  Chief Complaint:   Chief Complaint   Patient presents with    Shortness of Breath     Pt c/o sob that started 3 days ago. At baseline Pt wears 2L NC. At the time of triage Pt is   93% on 2L. Pt also c/o vomiting.      Hospital Problem/Diagnosis:  Principal Problem:    Pneumonia, unspecified organism  Resolved Problems:    * No resolved hospital problems. *    Imaging:   XR CHEST PORTABLE   Final Result   Cardiomegaly with pulmonary vascular congestion           Abnormal labs:   Abnormal Labs Reviewed   CBC WITH AUTO DIFFERENTIAL - Abnormal; Notable for the following components:       Result Value    WBC 13.1 (*)     Hemoglobin 10.8 (*)     Hematocrit 34.8 (*)     MCV 79.9 (*)     MCH 24.8 (*)     RDW 17.0 (*)     Neutrophils Absolute 10.2 (*)     All other components within normal limits   COMPREHENSIVE METABOLIC PANEL - Abnormal; Notable for the following components:    Glucose 163 (*)     Total Protein 6.1 (*)     Alkaline Phosphatase 139 (*)     All other components within normal limits   BRAIN NATRIURETIC PEPTIDE - Abnormal; Notable for the following components:    NT Pro- (*)     All other components within normal limits   BLOOD GAS, VENOUS - Abnormal; Notable for the following components:    pCO2, John 57.0 (*)     HCO3, Venous 35 (*)     All other components within normal limits     Critical values: no     Abnormal Assessment Findings: 
Hospital CPAP machine placed in patients room.  Patient feeling nauseous so will not be wearing the hospital unit at this time. Electronically signed by Charlotte Valentin RCP on 2/3/2025 at 4:04 AM    
NP PAGE via [employer]:     Pt complaint of 4/10 chest pain unresolved by current pain meds. Requesting increase in dose. Please advise. Thank you!!    Per NP, no new orders at this time.  
NP PAGE via [employer]:     Pt is 417lb. only 0.5mg Dilaudid ordered. Not due until 0247. Pt still requesting additional pain management for 7/10 chest discomfort. Please advise. Thank you!  
Patient taken to private vehicle via wheelchair.  
Pharmacy Medication Reconciliation Note     List of medications Toshia Manjarrez is currently taking is complete.    Source of information:   1. Conversation with patient at bedside  2. EMR    Notes regarding home medications:   1. Patient reports taking all home medications today prior to ED arrival -- ASA 81 mg QD, atorvastatin 80 mg QD, Zyrtec 10 mg QD, famotidine 40 mg QD, gabapentin 800 mg TID, hydroxyzine 25 mg TID, levothyroxine 75 mcg QD, pramipexole 1.5 mg BID, quetiapine 100 mg QD, spironolactone 50 mg QD, paroxetine 20 mg QD, oyster shell calcium QD, montelukast 10 mg QHS, carvedilol 25 mg BID, Jardiance 10 mg QD, Zetia 10 mg QD, losartan 100 mg QD, pantoprazole 40 mg BID.     2. Patient also uses an albuterol inhaler PRN, cyclobenzaprine 10 mg BID PRN, lorazepam 1 mg PRN, Zofran 4 mg ODT PRN, oxycodone 10 mg PRN, and ergocalciferol 1.25 mg 3 times a week.     3. Patient prescribed prednisone 20 mg BID x 3 days on 1/31/25 -- has one more day left of that regimen.     Patient denies taking any OTC or herbal medications other than those mentioned.     Hieu Yusuf, Pharmacy Intern  2/2/2025  8:37 PM   
Pt declined use of NIV overnight. Pt remains on NC. Will continue to monitor.  
Pt is awake alert and oriented able to make needs known. Respirations easy even no distress. Does state she gets short of breath with activity. Pt remains on home amount of oxygen. Pt complaining of upper abdominal pain but it is not time for pain medication so she was given scheduled dose of gabapentin and flexeril. Will monitor effectiveness. Pt educated on importance of small meals pt states that she does that at home. Call light within reach. Will monitor. Dr. Blancas in to see pt this morning as well.  
Pulmonary Progress Note    Date of Admission: 2/2/2025   LOS: 2 days       CC:  Chief Complaint   Patient presents with    Shortness of Breath     Pt c/o sob that started 3 days ago. At baseline Pt wears 2L NC. At the time of triage Pt is   93% on 2L. Pt also c/o vomiting.         Subjective:  Epigastric pain has resolved.  Has not eaten breakfast yet.    Assessment:           Plan:     This note may have been transcribed using Dragon Dictation software. Please disregard any translational errors.       Hospital Day: 2     Epigastric pain  See yesterday's note  Recurrent epigastric pain.  CT abdomen with p.o. contrast without etiology.  If the patient continues to have epigastric pain with eating, consider gastric emptying study to look for gastroparesis  While the patient describes this is chest pain, it is below the xiphoid process     Sleep apnea  Chronic hypercapnic respiratory failure  Hypoxia  BiPAP at home  Placed on CPAP 8 last night.  Patient on BiPAP at home.  Placed on 19/10  Wean oxygen to 90% saturation        Asthma  Airsupra at home        Obesity  Significantly impacting care in the ability to diagnose and all medical problems     Okay to discharge from a pulmonary standpoint.  For epigastric pain/gastric issues to the hospitalist.  Will sign off at this time.  Thank for the consultation.          Data:        PHYSICAL EXAM:   Blood pressure 118/67, pulse 73, temperature 97.7 °F (36.5 °C), temperature source Oral, resp. rate 18, height 1.651 m (5' 5\"), weight (!) 185.2 kg (408 lb 3.2 oz), SpO2 94%.'  Body mass index is 67.93 kg/m².   Gen: No distress.    ENT:   Resp: No accessory muscle use. No crackles. No wheezes. No rhonchi.    CV: Regular rate. Regular rhythm. No murmur or rub. No edema.   Skin: Warm, dry, normal texture and turgor. No nodule on exposed extremities.   M/S: No cyanosis. No clubbing. No joint deformity.  Psych: Oriented x 3. No anxiety.  Awake. Alert. Intact judgement and insight. 
90                       T:            26 QT:           372                                     QTc:          425                                                                Interpretive Statements SINUS RHYTHM LOW QRS VOLTAGE IN PRECORDIAL LEADS [QRS DEFLECTION < 1.0 mV IN CHEST LEADS] Electronically Signed On 01- 19:55:46 EST by Joel Salas    CT ANGIOGRAM PULMONARY W CONTRAST    Result Date: 1/31/2025  CT PULMONARY ANGIOGRAM,  1/31/2025 10:19 AM CLINICAL HISTORY:  -CP, SOB. COMPARISON:  None. TECHNIQUE:  PE protocol CT angiogram of the chest using Isovue 370 IV contrast as recorded in EPIC. 2-D multiplanar reconstructions and 3-D  MIP reconstructions reviewed.   Dose 1 : CT DLP Total : 766.28 mGycm DLP Spiral Max : 764.06 mGycm Maximum CTDI Vol : 23.9 mGy SSDE : 17.925 mGy SSDE Diameter : 43.5 cm SSDE Source : AP+Lat FINDINGS: Adequate visualization of the pulmonary arteries to the segmental/subsegmental level. No acute pulmonary embolism. No aortic arch aneurysm. No active failure, pneumonia, effusion, or pneumothorax. No suspicious pulmonary nodule or mediastinal mass lesion. Coronary artery calcification: Mild. Prior gastric bypass surgery and cholecystectomy.    No acute pulmonary embolism or other acute finding. - Note: Radiology results need to be interpreted within a comprehensive clinical context.  If you have questions about the radiology report, please contact the office of the ordering clinician.    XR CHEST AP PORTABLE    Result Date: 1/31/2025  XR CHEST AP PORTABLE,  1/31/2025 2:42 AM CLINICAL HISTORY:  -soa COMPARISON:  January 9, 2025 PROCEDURE COMMENTS: AP portable technique. FINDINGS: Support devices: No visible support devices. Cardiomegaly unchanged. Lungs are clear without effusion or pneumothorax.    Unchanged cardiomegaly. - Note: Radiology results need to be interpreted within a comprehensive clinical context.  If you have questions about the radiology report, please contact

## 2025-02-04 NOTE — PLAN OF CARE
Problem: Pain  Goal: Verbalizes/displays adequate comfort level or baseline comfort level  2/4/2025 0003 by Keo Vincent, RN  Outcome: Progressing  Flowsheets (Taken 2/4/2025 0003)  Verbalizes/displays adequate comfort level or baseline comfort level:   Encourage patient to monitor pain and request assistance   Assess pain using appropriate pain scale   Implement non-pharmacological measures as appropriate and evaluate response   Administer analgesics based on type and severity of pain and evaluate response     Problem: Respiratory - Adult  Goal: Achieves optimal ventilation and oxygenation  2/4/2025 0003 by Keo Vincent, RN  Outcome: Progressing  Flowsheets (Taken 2/4/2025 0003)  Achieves optimal ventilation and oxygenation:   Assess for changes in respiratory status   Position to facilitate oxygenation and minimize respiratory effort   Respiratory therapy support as indicated     Problem: Safety - Adult  Goal: Free from fall injury  2/4/2025 0003 by Keo Vincent, RN  Outcome: Progressing     Problem: Gastrointestinal - Adult  Goal: Minimal or absence of nausea and vomiting  Outcome: Progressing  Flowsheets (Taken 2/4/2025 0003)  Minimal or absence of nausea and vomiting: Administer IV fluids as ordered to ensure adequate hydration

## 2025-02-04 NOTE — PLAN OF CARE
Problem: Chronic Conditions and Co-morbidities  Goal: Patient's chronic conditions and co-morbidity symptoms are monitored and maintained or improved  Outcome: Progressing  Flowsheets (Taken 2/3/2025 0921)  Care Plan - Patient's Chronic Conditions and Co-Morbidity Symptoms are Monitored and Maintained or Improved:   Monitor and assess patient's chronic conditions and comorbid symptoms for stability, deterioration, or improvement   Collaborate with multidisciplinary team to address chronic and comorbid conditions and prevent exacerbation or deterioration   Update acute care plan with appropriate goals if chronic or comorbid symptoms are exacerbated and prevent overall improvement and discharge     Problem: Discharge Planning  Goal: Discharge to home or other facility with appropriate resources  Outcome: Progressing  Flowsheets (Taken 2/3/2025 0921)  Discharge to home or other facility with appropriate resources:   Identify barriers to discharge with patient and caregiver   Arrange for needed discharge resources and transportation as appropriate   Refer to discharge planning if patient needs post-hospital services based on physician order or complex needs related to functional status, cognitive ability or social support system     Problem: Pain  Goal: Verbalizes/displays adequate comfort level or baseline comfort level  Outcome: Progressing  Flowsheets (Taken 2/3/2025 0921)  Verbalizes/displays adequate comfort level or baseline comfort level:   Encourage patient to monitor pain and request assistance   Assess pain using appropriate pain scale   Administer analgesics based on type and severity of pain and evaluate response   Implement non-pharmacological measures as appropriate and evaluate response   Consider cultural and social influences on pain and pain management   Notify Licensed Independent Practitioner if interventions unsuccessful or patient reports new pain     Problem: Respiratory - Adult  Goal: Achieves

## 2025-02-05 NOTE — DISCHARGE SUMMARY
Date/Time    BLOODCULT2 No Growth after 4 days of incubation. 01/12/2023 02:38 PM     Organism: No results found for: \"ORG\"    Time Spent Discharging patient 32 minutes    Electronically signed by Aaron Brasher MD on 2/4/2025 at 10:46 PM